# Patient Record
Sex: FEMALE | Race: OTHER | HISPANIC OR LATINO | URBAN - METROPOLITAN AREA
[De-identification: names, ages, dates, MRNs, and addresses within clinical notes are randomized per-mention and may not be internally consistent; named-entity substitution may affect disease eponyms.]

---

## 2017-01-16 ENCOUNTER — EMERGENCY (EMERGENCY)
Facility: HOSPITAL | Age: 47
LOS: 1 days | Discharge: HOME | End: 2017-01-16
Attending: EMERGENCY MEDICINE | Admitting: EMERGENCY MEDICINE
Payer: COMMERCIAL

## 2017-01-16 PROCEDURE — 99285 EMERGENCY DEPT VISIT HI MDM: CPT | Mod: 25

## 2017-01-17 VITALS
DIASTOLIC BLOOD PRESSURE: 76 MMHG | RESPIRATION RATE: 18 BRPM | SYSTOLIC BLOOD PRESSURE: 153 MMHG | HEART RATE: 93 BPM | OXYGEN SATURATION: 100 %

## 2017-01-17 VITALS
OXYGEN SATURATION: 100 % | DIASTOLIC BLOOD PRESSURE: 92 MMHG | RESPIRATION RATE: 16 BRPM | TEMPERATURE: 98 F | SYSTOLIC BLOOD PRESSURE: 152 MMHG | HEART RATE: 104 BPM

## 2017-01-17 DIAGNOSIS — Z90.49 ACQUIRED ABSENCE OF OTHER SPECIFIED PARTS OF DIGESTIVE TRACT: Chronic | ICD-10-CM

## 2017-01-17 LAB
ALBUMIN SERPL ELPH-MCNC: 4.3 G/DL — SIGNIFICANT CHANGE UP (ref 3.3–5)
ALP SERPL-CCNC: 64 U/L — SIGNIFICANT CHANGE UP (ref 40–120)
ALT FLD-CCNC: 9 U/L — SIGNIFICANT CHANGE UP (ref 4–33)
APPEARANCE UR: SIGNIFICANT CHANGE UP
AST SERPL-CCNC: 22 U/L — SIGNIFICANT CHANGE UP (ref 4–32)
BACTERIA # UR AUTO: SIGNIFICANT CHANGE UP
BASE EXCESS BLDV CALC-SCNC: -1.7 MMOL/L — SIGNIFICANT CHANGE UP
BASE EXCESS BLDV CALC-SCNC: -3.5 MMOL/L — SIGNIFICANT CHANGE UP
BASOPHILS # BLD AUTO: 0.01 K/UL — SIGNIFICANT CHANGE UP (ref 0–0.2)
BASOPHILS NFR BLD AUTO: 0.1 % — SIGNIFICANT CHANGE UP (ref 0–2)
BILIRUB SERPL-MCNC: 0.7 MG/DL — SIGNIFICANT CHANGE UP (ref 0.2–1.2)
BILIRUB UR-MCNC: NEGATIVE — SIGNIFICANT CHANGE UP
BLOOD GAS VENOUS - CREATININE: 0.61 MG/DL — SIGNIFICANT CHANGE UP (ref 0.5–1.3)
BLOOD GAS VENOUS - CREATININE: 0.7 MG/DL — SIGNIFICANT CHANGE UP (ref 0.5–1.3)
BLOOD UR QL VISUAL: HIGH
BUN SERPL-MCNC: 10 MG/DL — SIGNIFICANT CHANGE UP (ref 7–23)
CALCIUM SERPL-MCNC: 9.3 MG/DL — SIGNIFICANT CHANGE UP (ref 8.4–10.5)
CHLORIDE BLDV-SCNC: 110 MMOL/L — HIGH (ref 96–108)
CHLORIDE BLDV-SCNC: 115 MMOL/L — HIGH (ref 96–108)
CHLORIDE SERPL-SCNC: 104 MMOL/L — SIGNIFICANT CHANGE UP (ref 98–107)
CO2 SERPL-SCNC: 20 MMOL/L — LOW (ref 22–31)
COLOR SPEC: YELLOW — SIGNIFICANT CHANGE UP
CREAT SERPL-MCNC: 0.78 MG/DL — SIGNIFICANT CHANGE UP (ref 0.5–1.3)
EOSINOPHIL # BLD AUTO: 0.03 K/UL — SIGNIFICANT CHANGE UP (ref 0–0.5)
EOSINOPHIL NFR BLD AUTO: 0.2 % — SIGNIFICANT CHANGE UP (ref 0–6)
GAS PNL BLDV: 138 MMOL/L — SIGNIFICANT CHANGE UP (ref 136–146)
GAS PNL BLDV: 139 MMOL/L — SIGNIFICANT CHANGE UP (ref 136–146)
GLUCOSE BLDV-MCNC: 126 — HIGH (ref 70–99)
GLUCOSE BLDV-MCNC: 158 — HIGH (ref 70–99)
GLUCOSE SERPL-MCNC: 163 MG/DL — HIGH (ref 70–99)
GLUCOSE UR-MCNC: NEGATIVE — SIGNIFICANT CHANGE UP
HCO3 BLDV-SCNC: 21 MMOL/L — SIGNIFICANT CHANGE UP (ref 20–27)
HCO3 BLDV-SCNC: 21 MMOL/L — SIGNIFICANT CHANGE UP (ref 20–27)
HCT VFR BLD CALC: 42.1 % — SIGNIFICANT CHANGE UP (ref 34.5–45)
HCT VFR BLDV CALC: 38.6 % — SIGNIFICANT CHANGE UP (ref 34.5–45)
HCT VFR BLDV CALC: 45.1 % — HIGH (ref 34.5–45)
HGB BLD-MCNC: 14.1 G/DL — SIGNIFICANT CHANGE UP (ref 11.5–15.5)
HGB BLDV-MCNC: 12.6 G/DL — SIGNIFICANT CHANGE UP (ref 11.5–15.5)
HGB BLDV-MCNC: 14.7 G/DL — SIGNIFICANT CHANGE UP (ref 11.5–15.5)
IMM GRANULOCYTES NFR BLD AUTO: 0.2 % — SIGNIFICANT CHANGE UP (ref 0–1.5)
KETONES UR-MCNC: SIGNIFICANT CHANGE UP
LACTATE BLDV-MCNC: 1.8 MMOL/L — SIGNIFICANT CHANGE UP (ref 0.5–2)
LACTATE BLDV-MCNC: 2.9 MMOL/L — HIGH (ref 0.5–2)
LEUKOCYTE ESTERASE UR-ACNC: HIGH
LIDOCAIN IGE QN: 24.1 U/L — SIGNIFICANT CHANGE UP (ref 7–60)
LYMPHOCYTES # BLD AUTO: 0.61 K/UL — LOW (ref 1–3.3)
LYMPHOCYTES # BLD AUTO: 4.5 % — LOW (ref 13–44)
MCHC RBC-ENTMCNC: 28.8 PG — SIGNIFICANT CHANGE UP (ref 27–34)
MCHC RBC-ENTMCNC: 33.5 % — SIGNIFICANT CHANGE UP (ref 32–36)
MCV RBC AUTO: 85.9 FL — SIGNIFICANT CHANGE UP (ref 80–100)
MONOCYTES # BLD AUTO: 0.25 K/UL — SIGNIFICANT CHANGE UP (ref 0–0.9)
MONOCYTES NFR BLD AUTO: 1.9 % — LOW (ref 2–14)
MUCOUS THREADS # UR AUTO: SIGNIFICANT CHANGE UP
NEUTROPHILS # BLD AUTO: 12.52 K/UL — HIGH (ref 1.8–7.4)
NEUTROPHILS NFR BLD AUTO: 93.1 % — HIGH (ref 43–77)
NITRITE UR-MCNC: NEGATIVE — SIGNIFICANT CHANGE UP
PCO2 BLDV: 38 MMHG — LOW (ref 41–51)
PCO2 BLDV: 40 MMHG — LOW (ref 41–51)
PH BLDV: 7.36 PH — SIGNIFICANT CHANGE UP (ref 7.32–7.43)
PH BLDV: 7.38 PH — SIGNIFICANT CHANGE UP (ref 7.32–7.43)
PH UR: 6 — SIGNIFICANT CHANGE UP (ref 4.6–8)
PLATELET # BLD AUTO: 275 K/UL — SIGNIFICANT CHANGE UP (ref 150–400)
PMV BLD: 11.8 FL — SIGNIFICANT CHANGE UP (ref 7–13)
PO2 BLDV: 37 MMHG — SIGNIFICANT CHANGE UP (ref 35–40)
PO2 BLDV: < 24 MMHG — LOW (ref 35–40)
POTASSIUM BLDV-SCNC: 3.5 MMOL/L — SIGNIFICANT CHANGE UP (ref 3.4–4.5)
POTASSIUM BLDV-SCNC: 3.6 MMOL/L — SIGNIFICANT CHANGE UP (ref 3.4–4.5)
POTASSIUM SERPL-MCNC: 3.9 MMOL/L — SIGNIFICANT CHANGE UP (ref 3.5–5.3)
POTASSIUM SERPL-SCNC: 3.9 MMOL/L — SIGNIFICANT CHANGE UP (ref 3.5–5.3)
PROT SERPL-MCNC: 8.2 G/DL — SIGNIFICANT CHANGE UP (ref 6–8.3)
PROT UR-MCNC: 100 — HIGH
RBC # BLD: 4.9 M/UL — SIGNIFICANT CHANGE UP (ref 3.8–5.2)
RBC # FLD: 13.6 % — SIGNIFICANT CHANGE UP (ref 10.3–14.5)
RBC CASTS # UR COMP ASSIST: SIGNIFICANT CHANGE UP (ref 0–?)
SAO2 % BLDV: 23.4 % — LOW (ref 60–85)
SAO2 % BLDV: 64.3 % — SIGNIFICANT CHANGE UP (ref 60–85)
SODIUM SERPL-SCNC: 141 MMOL/L — SIGNIFICANT CHANGE UP (ref 135–145)
SP GR SPEC: 1.04 — HIGH (ref 1–1.03)
SQUAMOUS # UR AUTO: SIGNIFICANT CHANGE UP
UROBILINOGEN FLD QL: NORMAL E.U. — SIGNIFICANT CHANGE UP (ref 0.1–0.2)
WBC # BLD: 13.45 K/UL — HIGH (ref 3.8–10.5)
WBC # FLD AUTO: 13.45 K/UL — HIGH (ref 3.8–10.5)
WBC UR QL: SIGNIFICANT CHANGE UP (ref 0–?)

## 2017-01-17 RX ORDER — ONDANSETRON 8 MG/1
1 TABLET, FILM COATED ORAL
Qty: 6 | Refills: 0 | OUTPATIENT
Start: 2017-01-17 | End: 2017-01-19

## 2017-01-17 RX ORDER — FAMOTIDINE 10 MG/ML
20 INJECTION INTRAVENOUS ONCE
Qty: 0 | Refills: 0 | Status: COMPLETED | OUTPATIENT
Start: 2017-01-17 | End: 2017-01-17

## 2017-01-17 RX ORDER — KETOROLAC TROMETHAMINE 30 MG/ML
15 SYRINGE (ML) INJECTION ONCE
Qty: 0 | Refills: 0 | Status: DISCONTINUED | OUTPATIENT
Start: 2017-01-17 | End: 2017-01-17

## 2017-01-17 RX ORDER — SODIUM CHLORIDE 9 MG/ML
1000 INJECTION INTRAMUSCULAR; INTRAVENOUS; SUBCUTANEOUS ONCE
Qty: 0 | Refills: 0 | Status: COMPLETED | OUTPATIENT
Start: 2017-01-17 | End: 2017-01-17

## 2017-01-17 RX ORDER — ONDANSETRON 8 MG/1
4 TABLET, FILM COATED ORAL ONCE
Qty: 0 | Refills: 0 | Status: COMPLETED | OUTPATIENT
Start: 2017-01-17 | End: 2017-01-17

## 2017-01-17 RX ADMIN — FAMOTIDINE 20 MILLIGRAM(S): 10 INJECTION INTRAVENOUS at 03:01

## 2017-01-17 RX ADMIN — Medication 15 MILLIGRAM(S): at 03:01

## 2017-01-17 RX ADMIN — SODIUM CHLORIDE 1000 MILLILITER(S): 9 INJECTION INTRAMUSCULAR; INTRAVENOUS; SUBCUTANEOUS at 04:26

## 2017-01-17 RX ADMIN — ONDANSETRON 4 MILLIGRAM(S): 8 TABLET, FILM COATED ORAL at 03:02

## 2017-01-17 RX ADMIN — SODIUM CHLORIDE 1000 MILLILITER(S): 9 INJECTION INTRAMUSCULAR; INTRAVENOUS; SUBCUTANEOUS at 02:57

## 2017-01-17 RX ADMIN — Medication 15 MILLIGRAM(S): at 04:39

## 2017-01-17 NOTE — ED ADULT NURSE NOTE - OBJECTIVE STATEMENT
pt presents to R#13 AOx4, in NAD, c/o midepigastric abdominal pain "cramping" waxing and waning x5 hrs with multiple episodes vomiting and diarrhea, unable to tolerate any PO intake. Denies any new/ unusual food intake today, deneis recent travel, denies fevers/ chills/ urinary changes/ other acute medical complaints. Well-appearing, is not actively vomiting at this time. IV inserted, blood work collected and sent, MD at bedside. Awaiting test results. Will cont to monitor.

## 2017-01-17 NOTE — ED PROVIDER NOTE - ATTENDING CONTRIBUTION TO CARE
Attending Attestation: Dr. Martin  I have personally performed a history and physical examination of the patient and discussed management with the resident as well as the patient.  I reviewed the resident's note and agree with the documented findings and plan of care.  I have authored and modified critical sections of the Provider Note, including but not limited to HPI, Physical Exam and MDM. 46F p/w vague abdominal pain. generalized a/w N/V. suggestive of possible AGE. Soft, diffusely tender abd. Would consider biliary etiology although less likely given prior gallbladder removal. Would hydrate, CBC,UCG,CMP, zofran, toradal.pepcid. reassess.

## 2017-01-17 NOTE — ED PROVIDER NOTE - ABDOMINAL TENDER
umbilical/no CVAT. no guarding. no rebound. normal BS./right lower quadrant/periumbilical/right upper quadrant/left lower quadrant/left upper quadrant

## 2017-01-17 NOTE — ED PROVIDER NOTE - MEDICAL DECISION MAKING DETAILS
46F p/w vague abdominal pain. generalized. suggestive of possible viral source. would evaluate for biliary etiology alothought less likely given prior gallbladder removal. clinically looks unremarkable. would hydrate, CBC,UCG,CMP, zofran, toradal.pepcid. reassess. 46F p/w vague abdominal pain. generalized a/w N/V. suggestive of possible AGE. Soft, diffusely tender abd. Would consider biliary etiology although less likely given prior gallbladder removal. Would hydrate, CBC,UCG,CMP, zofran, toradal.pepcid. reassess.

## 2017-01-17 NOTE — ED PROVIDER NOTE - CONSTITUTIONAL, MLM
normal... Well appearing, well nourished, awake, alert, oriented to person, place, time/situation and in no apparent distress. - - -

## 2017-01-17 NOTE — ED PROVIDER NOTE - OBJECTIVE STATEMENT
46F no sig. pmhx p/w diffuse abd pain starting 9pm. cramping pain. associated with n/V/D. non-bloody. no fever (+) chills. no radiation. not-associated with food intake. no  symptoms. no Vaginal symptoms. LMP week of christmas. no recent travel or sick contact. no environmental exposure. no syncope. no CP/SOB. hx of cholecystectomy. 46F no sig. pmhx p/w diffuse abd pain starting 9pm. cramping pain. associated with n/V/D. non-bloody. no fever (+) chills. no radiation. not-associated with food intake. no  symptoms. no Vaginal symptoms. LMP week of shaniqua. no recent travel or sick contact. no environmental exposure. no syncope. no CP/SOB. hx of cholecystectomy. Is passing flatus. This has nnever happened before.

## 2017-01-17 NOTE — ED PROVIDER NOTE - PROGRESS NOTE DETAILS
Pain much better, still feeling dehydrated. Pain improved. feels better. D/C home to continue hydration. Tylenol and/or motrin for pain.

## 2017-04-29 ENCOUNTER — APPOINTMENT (OUTPATIENT)
Dept: INTERNAL MEDICINE | Facility: CLINIC | Age: 47
End: 2017-04-29

## 2017-04-29 VITALS
HEIGHT: 59 IN | RESPIRATION RATE: 12 BRPM | BODY MASS INDEX: 34.27 KG/M2 | HEART RATE: 85 BPM | OXYGEN SATURATION: 98 % | SYSTOLIC BLOOD PRESSURE: 121 MMHG | DIASTOLIC BLOOD PRESSURE: 80 MMHG | WEIGHT: 170 LBS | TEMPERATURE: 98.1 F

## 2017-04-29 DIAGNOSIS — M25.561 PAIN IN RIGHT KNEE: ICD-10-CM

## 2017-04-29 DIAGNOSIS — Z80.1 FAMILY HISTORY OF MALIGNANT NEOPLASM OF TRACHEA, BRONCHUS AND LUNG: ICD-10-CM

## 2017-04-29 RX ORDER — ETONOGESTREL AND ETHINYL ESTRADIOL .12; .015 MG/D; MG/D
0.12-0.015 INSERT, EXTENDED RELEASE VAGINAL
Qty: 3 | Refills: 0 | Status: ACTIVE | COMMUNITY
Start: 2017-03-29

## 2017-04-29 RX ORDER — BRIMONIDINE TARTRATE, TIMOLOL MALEATE 2; 5 MG/ML; MG/ML
0.2-0.5 SOLUTION/ DROPS OPHTHALMIC
Refills: 0 | Status: ACTIVE | COMMUNITY

## 2017-04-29 RX ORDER — ONDANSETRON 4 MG/1
4 TABLET, ORALLY DISINTEGRATING ORAL
Qty: 6 | Refills: 0 | Status: DISCONTINUED | COMMUNITY
Start: 2017-01-17

## 2017-05-08 LAB
25(OH)D3 SERPL-MCNC: 35.9 NG/ML
ALBUMIN SERPL ELPH-MCNC: 4.2 G/DL
ALP BLD-CCNC: 69 U/L
ALT SERPL-CCNC: 27 U/L
ANION GAP SERPL CALC-SCNC: 17 MMOL/L
AST SERPL-CCNC: 23 U/L
BASOPHILS # BLD AUTO: 0.02 K/UL
BASOPHILS NFR BLD AUTO: 0.3 %
BILIRUB SERPL-MCNC: 0.4 MG/DL
BUN SERPL-MCNC: 10 MG/DL
CALCIUM SERPL-MCNC: 9.6 MG/DL
CHLORIDE SERPL-SCNC: 106 MMOL/L
CHOLEST SERPL-MCNC: 176 MG/DL
CHOLEST/HDLC SERPL: 2.6 RATIO
CO2 SERPL-SCNC: 20 MMOL/L
CREAT SERPL-MCNC: 0.69 MG/DL
EOSINOPHIL # BLD AUTO: 0.17 K/UL
EOSINOPHIL NFR BLD AUTO: 2.5 %
GLUCOSE SERPL-MCNC: 92 MG/DL
HBA1C MFR BLD HPLC: 5.7 %
HCT VFR BLD CALC: 41.8 %
HDLC SERPL-MCNC: 68 MG/DL
HGB BLD-MCNC: 13.2 G/DL
IMM GRANULOCYTES NFR BLD AUTO: 0 %
LDLC SERPL CALC-MCNC: 64 MG/DL
LYMPHOCYTES # BLD AUTO: 2.69 K/UL
LYMPHOCYTES NFR BLD AUTO: 40 %
MAN DIFF?: NORMAL
MCHC RBC-ENTMCNC: 28.4 PG
MCHC RBC-ENTMCNC: 31.6 GM/DL
MCV RBC AUTO: 89.9 FL
MONOCYTES # BLD AUTO: 0.36 K/UL
MONOCYTES NFR BLD AUTO: 5.4 %
NEUTROPHILS # BLD AUTO: 3.48 K/UL
NEUTROPHILS NFR BLD AUTO: 51.8 %
PLATELET # BLD AUTO: 293 K/UL
POTASSIUM SERPL-SCNC: 4.5 MMOL/L
PROT SERPL-MCNC: 7.2 G/DL
RBC # BLD: 4.65 M/UL
RBC # FLD: 14.6 %
SODIUM SERPL-SCNC: 143 MMOL/L
TRIGL SERPL-MCNC: 221 MG/DL
TSH SERPL-ACNC: 0.79 UIU/ML
VIT B12 SERPL-MCNC: 461 PG/ML
WBC # FLD AUTO: 6.72 K/UL

## 2017-05-23 LAB
APPEARANCE: ABNORMAL
BACTERIA: ABNORMAL
BILIRUBIN URINE: NEGATIVE
BLOOD URINE: NEGATIVE
COLOR: ABNORMAL
GLUCOSE QUALITATIVE U: NORMAL MG/DL
HYALINE CASTS: 0 /LPF
KETONES URINE: NEGATIVE
LEUKOCYTE ESTERASE URINE: NEGATIVE
MICROSCOPIC-UA: NORMAL
NITRITE URINE: NEGATIVE
PH URINE: 5.5
PROTEIN URINE: NEGATIVE MG/DL
RED BLOOD CELLS URINE: 1 /HPF
SPECIFIC GRAVITY URINE: 1.03
SQUAMOUS EPITHELIAL CELLS: >27 /HPF
UROBILINOGEN URINE: NORMAL MG/DL
WHITE BLOOD CELLS URINE: 5 /HPF

## 2018-02-28 ENCOUNTER — APPOINTMENT (OUTPATIENT)
Dept: INTERNAL MEDICINE | Facility: CLINIC | Age: 48
End: 2018-02-28
Payer: COMMERCIAL

## 2018-02-28 VITALS
DIASTOLIC BLOOD PRESSURE: 88 MMHG | WEIGHT: 167 LBS | SYSTOLIC BLOOD PRESSURE: 153 MMHG | HEART RATE: 86 BPM | OXYGEN SATURATION: 98 % | HEIGHT: 59 IN | RESPIRATION RATE: 12 BRPM | BODY MASS INDEX: 33.67 KG/M2 | TEMPERATURE: 99 F

## 2018-02-28 VITALS — DIASTOLIC BLOOD PRESSURE: 92 MMHG | SYSTOLIC BLOOD PRESSURE: 160 MMHG

## 2018-02-28 PROCEDURE — 90715 TDAP VACCINE 7 YRS/> IM: CPT

## 2018-02-28 PROCEDURE — 90471 IMMUNIZATION ADMIN: CPT

## 2018-02-28 PROCEDURE — 99214 OFFICE O/P EST MOD 30 MIN: CPT | Mod: 25

## 2018-03-07 ENCOUNTER — APPOINTMENT (OUTPATIENT)
Dept: INTERNAL MEDICINE | Facility: CLINIC | Age: 48
End: 2018-03-07

## 2018-03-09 ENCOUNTER — APPOINTMENT (OUTPATIENT)
Dept: INTERNAL MEDICINE | Facility: CLINIC | Age: 48
End: 2018-03-09
Payer: COMMERCIAL

## 2018-03-09 ENCOUNTER — NON-APPOINTMENT (OUTPATIENT)
Age: 48
End: 2018-03-09

## 2018-03-09 VITALS
HEIGHT: 59 IN | RESPIRATION RATE: 12 BRPM | BODY MASS INDEX: 32.86 KG/M2 | DIASTOLIC BLOOD PRESSURE: 94 MMHG | HEART RATE: 90 BPM | SYSTOLIC BLOOD PRESSURE: 154 MMHG | WEIGHT: 163 LBS | OXYGEN SATURATION: 99 % | TEMPERATURE: 99.1 F

## 2018-03-09 DIAGNOSIS — Z23 ENCOUNTER FOR IMMUNIZATION: ICD-10-CM

## 2018-03-09 DIAGNOSIS — H93.19 TINNITUS, UNSPECIFIED EAR: ICD-10-CM

## 2018-03-09 PROCEDURE — 93000 ELECTROCARDIOGRAM COMPLETE: CPT

## 2018-03-09 PROCEDURE — 99214 OFFICE O/P EST MOD 30 MIN: CPT

## 2018-03-11 PROBLEM — H93.19 TINNITUS: Status: ACTIVE | Noted: 2018-03-09

## 2018-03-11 PROBLEM — Z23 NEED FOR TDAP VACCINATION: Status: ACTIVE | Noted: 2018-02-28

## 2018-03-11 RX ORDER — MELOXICAM 7.5 MG/1
7.5 TABLET ORAL DAILY
Qty: 30 | Refills: 0 | Status: DISCONTINUED | COMMUNITY
Start: 2017-04-29 | End: 2018-03-11

## 2018-03-20 LAB
ALBUMIN SERPL ELPH-MCNC: 3.9 G/DL
ALP BLD-CCNC: 63 U/L
ALT SERPL-CCNC: 15 U/L
ANION GAP SERPL CALC-SCNC: 14 MMOL/L
AST SERPL-CCNC: 21 U/L
BILIRUB SERPL-MCNC: 0.2 MG/DL
BUN SERPL-MCNC: 12 MG/DL
CALCIUM SERPL-MCNC: 9.3 MG/DL
CHLORIDE SERPL-SCNC: 103 MMOL/L
CO2 SERPL-SCNC: 24 MMOL/L
CREAT SERPL-MCNC: 0.43 MG/DL
GLUCOSE SERPL-MCNC: 71 MG/DL
POTASSIUM SERPL-SCNC: 4.1 MMOL/L
PROT SERPL-MCNC: 7.5 G/DL
SODIUM SERPL-SCNC: 141 MMOL/L
TSH SERPL-ACNC: 0.69 UIU/ML

## 2018-04-07 ENCOUNTER — APPOINTMENT (OUTPATIENT)
Dept: INTERNAL MEDICINE | Facility: CLINIC | Age: 48
End: 2018-04-07
Payer: COMMERCIAL

## 2018-04-07 VITALS
WEIGHT: 168 LBS | TEMPERATURE: 98.5 F | DIASTOLIC BLOOD PRESSURE: 90 MMHG | SYSTOLIC BLOOD PRESSURE: 139 MMHG | HEIGHT: 59 IN | BODY MASS INDEX: 33.87 KG/M2 | RESPIRATION RATE: 12 BRPM | HEART RATE: 84 BPM | OXYGEN SATURATION: 98 %

## 2018-04-07 PROCEDURE — 99214 OFFICE O/P EST MOD 30 MIN: CPT

## 2018-05-18 ENCOUNTER — APPOINTMENT (OUTPATIENT)
Dept: INTERNAL MEDICINE | Facility: CLINIC | Age: 48
End: 2018-05-18
Payer: COMMERCIAL

## 2018-05-18 VITALS
DIASTOLIC BLOOD PRESSURE: 89 MMHG | HEART RATE: 79 BPM | BODY MASS INDEX: 34.27 KG/M2 | OXYGEN SATURATION: 99 % | SYSTOLIC BLOOD PRESSURE: 142 MMHG | RESPIRATION RATE: 12 BRPM | WEIGHT: 170 LBS | TEMPERATURE: 98.6 F | HEIGHT: 59 IN

## 2018-05-18 PROCEDURE — 99214 OFFICE O/P EST MOD 30 MIN: CPT

## 2018-05-18 RX ORDER — LOSARTAN POTASSIUM 25 MG/1
25 TABLET, FILM COATED ORAL
Qty: 30 | Refills: 0 | Status: DISCONTINUED | COMMUNITY
Start: 2018-03-09

## 2018-05-18 NOTE — COUNSELING
[Weight management counseling provided] : Weight management [Healthy eating counseling provided] : healthy eating

## 2018-05-18 NOTE — PLAN
[FreeTextEntry1] : 1. Hypertension \par low Na diet\par Losartan 50 mg QD\par 2. Hypercholesterolemia/ Obesity\par low cholesterol diet/ exercise\par repeat fasting lipids next visit

## 2018-05-18 NOTE — PHYSICAL EXAM
[No Acute Distress] : no acute distress [Well Nourished] : well nourished [Well Developed] : well developed [Normal Sclera/Conjunctiva] : normal sclera/conjunctiva [EOMI] : extraocular movements intact [Normal Outer Ear/Nose] : the outer ears and nose were normal in appearance [Normal Oropharynx] : the oropharynx was normal [No JVD] : no jugular venous distention [Supple] : supple [No Lymphadenopathy] : no lymphadenopathy [Thyroid Normal, No Nodules] : the thyroid was normal and there were no nodules present [No Respiratory Distress] : no respiratory distress  [Clear to Auscultation] : lungs were clear to auscultation bilaterally [No Accessory Muscle Use] : no accessory muscle use [Normal Rate] : normal rate  [Regular Rhythm] : with a regular rhythm [Normal S1, S2] : normal S1 and S2 [No Murmur] : no murmur heard [No Carotid Bruits] : no carotid bruits [Pedal Pulses Present] : the pedal pulses are present [No Edema] : there was no peripheral edema [Soft] : abdomen soft [Non Tender] : non-tender [Non-distended] : non-distended [No Masses] : no abdominal mass palpated [No HSM] : no HSM [Normal Bowel Sounds] : normal bowel sounds [Normal Posterior Cervical Nodes] : no posterior cervical lymphadenopathy [Normal Anterior Cervical Nodes] : no anterior cervical lymphadenopathy [No CVA Tenderness] : no CVA  tenderness [No Spinal Tenderness] : no spinal tenderness [No Joint Swelling] : no joint swelling [Grossly Normal Strength/Tone] : grossly normal strength/tone [No Rash] : no rash [Normal Gait] : normal gait [Coordination Grossly Intact] : coordination grossly intact [No Focal Deficits] : no focal deficits [Deep Tendon Reflexes (DTR)] : deep tendon reflexes were 2+ and symmetric [Normal Affect] : the affect was normal [Normal Insight/Judgement] : insight and judgment were intact [de-identified] : obese

## 2018-05-18 NOTE — HISTORY OF PRESENT ILLNESS
[de-identified] : 47 year old female with h/o Glaucoma/ Hypertension/ Hypercholesterolemia  presents for follow up on hypertension. \par She is doing well, physically active, unable to loose weight. She denies CP/SOB, dizziness, N, V, abdominal pain.

## 2018-05-21 LAB
ANION GAP SERPL CALC-SCNC: 13 MMOL/L
BUN SERPL-MCNC: 10 MG/DL
CALCIUM SERPL-MCNC: 9.4 MG/DL
CHLORIDE SERPL-SCNC: 106 MMOL/L
CO2 SERPL-SCNC: 20 MMOL/L
CREAT SERPL-MCNC: 0.66 MG/DL
GLUCOSE SERPL-MCNC: 123 MG/DL
POTASSIUM SERPL-SCNC: 3.9 MMOL/L
SODIUM SERPL-SCNC: 139 MMOL/L

## 2018-06-01 ENCOUNTER — MEDICATION RENEWAL (OUTPATIENT)
Age: 48
End: 2018-06-01

## 2018-06-04 ENCOUNTER — RX RENEWAL (OUTPATIENT)
Age: 48
End: 2018-06-04

## 2018-08-04 ENCOUNTER — APPOINTMENT (OUTPATIENT)
Dept: INTERNAL MEDICINE | Facility: CLINIC | Age: 48
End: 2018-08-04
Payer: COMMERCIAL

## 2018-08-04 VITALS
HEIGHT: 56 IN | HEART RATE: 86 BPM | BODY MASS INDEX: 38.02 KG/M2 | OXYGEN SATURATION: 97 % | DIASTOLIC BLOOD PRESSURE: 92 MMHG | TEMPERATURE: 98.9 F | WEIGHT: 169 LBS | SYSTOLIC BLOOD PRESSURE: 139 MMHG | RESPIRATION RATE: 12 BRPM

## 2018-08-04 PROCEDURE — 99214 OFFICE O/P EST MOD 30 MIN: CPT

## 2018-08-11 ENCOUNTER — APPOINTMENT (OUTPATIENT)
Dept: INTERNAL MEDICINE | Facility: CLINIC | Age: 48
End: 2018-08-11

## 2018-08-20 NOTE — PLAN
[FreeTextEntry1] : 1. Hypertension \par low Na diet\par Losartan 50 mg QD\par HCTZ 12.5 mg QD\par 2. Hypercholesterolemia/ Obesity\par low cholesterol diet/ exercise\par pt to come for fasting lipids

## 2018-08-20 NOTE — PHYSICAL EXAM
[No Acute Distress] : no acute distress [Well Nourished] : well nourished [Well Developed] : well developed [Normal Sclera/Conjunctiva] : normal sclera/conjunctiva [EOMI] : extraocular movements intact [Normal Outer Ear/Nose] : the outer ears and nose were normal in appearance [Normal Oropharynx] : the oropharynx was normal [Normal TMs] : both tympanic membranes were normal [No JVD] : no jugular venous distention [Supple] : supple [No Lymphadenopathy] : no lymphadenopathy [Thyroid Normal, No Nodules] : the thyroid was normal and there were no nodules present [No Respiratory Distress] : no respiratory distress  [Clear to Auscultation] : lungs were clear to auscultation bilaterally [No Accessory Muscle Use] : no accessory muscle use [Normal Rate] : normal rate  [Regular Rhythm] : with a regular rhythm [Normal S1, S2] : normal S1 and S2 [No Murmur] : no murmur heard [No Carotid Bruits] : no carotid bruits [Pedal Pulses Present] : the pedal pulses are present [No Edema] : there was no peripheral edema [Soft] : abdomen soft [Non Tender] : non-tender [Non-distended] : non-distended [No Masses] : no abdominal mass palpated [No HSM] : no HSM [Normal Bowel Sounds] : normal bowel sounds [Normal Posterior Cervical Nodes] : no posterior cervical lymphadenopathy [Normal Anterior Cervical Nodes] : no anterior cervical lymphadenopathy [No CVA Tenderness] : no CVA  tenderness [No Spinal Tenderness] : no spinal tenderness [No Joint Swelling] : no joint swelling [Grossly Normal Strength/Tone] : grossly normal strength/tone [No Rash] : no rash [Normal Gait] : normal gait [Coordination Grossly Intact] : coordination grossly intact [No Focal Deficits] : no focal deficits [Deep Tendon Reflexes (DTR)] : deep tendon reflexes were 2+ and symmetric [Normal Affect] : the affect was normal [Normal Insight/Judgement] : insight and judgment were intact [de-identified] : obese

## 2018-08-20 NOTE — HISTORY OF PRESENT ILLNESS
[de-identified] : 47 year old female with h/o  Glaucoma/ Hypertension/ Hypercholesterolemia  presents for follow up on hypertension/ hyperlipidemia . \par She is doing well, physically active. She denies CP/SOB, dizziness, N, V, abdominal pain.

## 2018-10-08 ENCOUNTER — RX RENEWAL (OUTPATIENT)
Age: 48
End: 2018-10-08

## 2018-10-27 ENCOUNTER — APPOINTMENT (OUTPATIENT)
Dept: INTERNAL MEDICINE | Facility: CLINIC | Age: 48
End: 2018-10-27
Payer: COMMERCIAL

## 2018-10-27 VITALS
OXYGEN SATURATION: 97 % | WEIGHT: 172 LBS | SYSTOLIC BLOOD PRESSURE: 129 MMHG | HEIGHT: 56 IN | BODY MASS INDEX: 38.69 KG/M2 | RESPIRATION RATE: 12 BRPM | TEMPERATURE: 98.6 F | DIASTOLIC BLOOD PRESSURE: 83 MMHG | HEART RATE: 77 BPM

## 2018-10-27 PROCEDURE — 99214 OFFICE O/P EST MOD 30 MIN: CPT

## 2018-10-29 LAB
ALBUMIN SERPL ELPH-MCNC: 4 G/DL
ALP BLD-CCNC: 70 U/L
ALT SERPL-CCNC: 20 U/L
ANION GAP SERPL CALC-SCNC: 13 MMOL/L
AST SERPL-CCNC: 19 U/L
BILIRUB SERPL-MCNC: 0.5 MG/DL
BUN SERPL-MCNC: 14 MG/DL
CALCIUM SERPL-MCNC: 9.3 MG/DL
CHLORIDE SERPL-SCNC: 102 MMOL/L
CHOLEST SERPL-MCNC: 170 MG/DL
CHOLEST/HDLC SERPL: 3.3 RATIO
CO2 SERPL-SCNC: 25 MMOL/L
CREAT SERPL-MCNC: 0.6 MG/DL
GLUCOSE SERPL-MCNC: 78 MG/DL
HBA1C MFR BLD HPLC: 5.6 %
HDLC SERPL-MCNC: 51 MG/DL
LDLC SERPL CALC-MCNC: 75 MG/DL
POTASSIUM SERPL-SCNC: 3.8 MMOL/L
PROT SERPL-MCNC: 6.9 G/DL
SODIUM SERPL-SCNC: 140 MMOL/L
TRIGL SERPL-MCNC: 220 MG/DL

## 2018-11-06 NOTE — HISTORY OF PRESENT ILLNESS
[de-identified] : 48 year old female with h/o  Glaucoma/ Hypertension/ Hypercholesterolemia  presents for follow up on hypertension/ hyperlipidemia . \par She is doing well, physically active. She denies CP/SOB, dizziness, N, V, abdominal pain. \par Pt c/o itchy rash b/ l upper thigh area related to excessive sweating.

## 2018-11-06 NOTE — PLAN
[FreeTextEntry1] : 1. Hypertension \par low Na diet\par Losartan 50 mg QD\par HCTZ 12.5 mg QD\par 2. Hypercholesterolemia/ Obesity\par low cholesterol diet/ exercise\par pt to come for fasting lipids \par 3. see plan

## 2018-11-06 NOTE — PHYSICAL EXAM
[No Acute Distress] : no acute distress [Well Nourished] : well nourished [Well Developed] : well developed [Normal Sclera/Conjunctiva] : normal sclera/conjunctiva [EOMI] : extraocular movements intact [Normal Outer Ear/Nose] : the outer ears and nose were normal in appearance [Normal Oropharynx] : the oropharynx was normal [Normal TMs] : both tympanic membranes were normal [No JVD] : no jugular venous distention [Supple] : supple [No Lymphadenopathy] : no lymphadenopathy [Thyroid Normal, No Nodules] : the thyroid was normal and there were no nodules present [No Respiratory Distress] : no respiratory distress  [Clear to Auscultation] : lungs were clear to auscultation bilaterally [No Accessory Muscle Use] : no accessory muscle use [Normal Rate] : normal rate  [Regular Rhythm] : with a regular rhythm [Normal S1, S2] : normal S1 and S2 [No Murmur] : no murmur heard [No Carotid Bruits] : no carotid bruits [Pedal Pulses Present] : the pedal pulses are present [No Edema] : there was no peripheral edema [Soft] : abdomen soft [Non Tender] : non-tender [Non-distended] : non-distended [No Masses] : no abdominal mass palpated [No HSM] : no HSM [Normal Bowel Sounds] : normal bowel sounds [Normal Posterior Cervical Nodes] : no posterior cervical lymphadenopathy [Normal Anterior Cervical Nodes] : no anterior cervical lymphadenopathy [No CVA Tenderness] : no CVA  tenderness [No Spinal Tenderness] : no spinal tenderness [No Joint Swelling] : no joint swelling [Grossly Normal Strength/Tone] : grossly normal strength/tone [Normal Gait] : normal gait [Coordination Grossly Intact] : coordination grossly intact [No Focal Deficits] : no focal deficits [Deep Tendon Reflexes (DTR)] : deep tendon reflexes were 2+ and symmetric [Normal Affect] : the affect was normal [Normal Insight/Judgement] : insight and judgment were intact [de-identified] : obese [de-identified] : + erythematous rash b/l thigh area

## 2018-11-08 LAB
BASOPHILS # BLD AUTO: 0.02 K/UL
BASOPHILS NFR BLD AUTO: 0.3 %
EOSINOPHIL # BLD AUTO: 0.29 K/UL
EOSINOPHIL NFR BLD AUTO: 4.2 %
HCT VFR BLD CALC: 37.3 %
HGB BLD-MCNC: 12.2 G/DL
IMM GRANULOCYTES NFR BLD AUTO: 0.1 %
LYMPHOCYTES # BLD AUTO: 2.58 K/UL
LYMPHOCYTES NFR BLD AUTO: 37 %
MAN DIFF?: NORMAL
MCHC RBC-ENTMCNC: 29.4 PG
MCHC RBC-ENTMCNC: 32.7 GM/DL
MCV RBC AUTO: 89.9 FL
MONOCYTES # BLD AUTO: 0.45 K/UL
MONOCYTES NFR BLD AUTO: 6.4 %
NEUTROPHILS # BLD AUTO: 3.63 K/UL
NEUTROPHILS NFR BLD AUTO: 52 %
PLATELET # BLD AUTO: 306 K/UL
RBC # BLD: 4.15 M/UL
RBC # FLD: 13.8 %
WBC # FLD AUTO: 6.98 K/UL

## 2019-03-08 ENCOUNTER — EMERGENCY (EMERGENCY)
Facility: HOSPITAL | Age: 49
LOS: 1 days | Discharge: HOME | End: 2019-03-08
Attending: EMERGENCY MEDICINE | Admitting: EMERGENCY MEDICINE
Payer: COMMERCIAL

## 2019-03-08 VITALS
OXYGEN SATURATION: 100 % | RESPIRATION RATE: 18 BRPM | TEMPERATURE: 97 F | SYSTOLIC BLOOD PRESSURE: 138 MMHG | DIASTOLIC BLOOD PRESSURE: 86 MMHG | HEART RATE: 90 BPM

## 2019-03-08 DIAGNOSIS — Z90.49 ACQUIRED ABSENCE OF OTHER SPECIFIED PARTS OF DIGESTIVE TRACT: Chronic | ICD-10-CM

## 2019-03-08 LAB
APPEARANCE UR: CLEAR — SIGNIFICANT CHANGE UP
BACTERIA # UR AUTO: SIGNIFICANT CHANGE UP
BILIRUB UR-MCNC: NEGATIVE — SIGNIFICANT CHANGE UP
BLOOD UR QL VISUAL: SIGNIFICANT CHANGE UP
COLOR SPEC: YELLOW — SIGNIFICANT CHANGE UP
GLUCOSE UR-MCNC: NEGATIVE — SIGNIFICANT CHANGE UP
KETONES UR-MCNC: NEGATIVE — SIGNIFICANT CHANGE UP
LEUKOCYTE ESTERASE UR-ACNC: SIGNIFICANT CHANGE UP
MUCOUS THREADS # UR AUTO: SIGNIFICANT CHANGE UP
NITRITE UR-MCNC: NEGATIVE — SIGNIFICANT CHANGE UP
PH UR: 6 — SIGNIFICANT CHANGE UP (ref 5–8)
PROT UR-MCNC: 30 — SIGNIFICANT CHANGE UP
RBC CASTS # UR COMP ASSIST: SIGNIFICANT CHANGE UP (ref 0–?)
SP GR SPEC: 1.03 — SIGNIFICANT CHANGE UP (ref 1–1.04)
SQUAMOUS # UR AUTO: SIGNIFICANT CHANGE UP
UROBILINOGEN FLD QL: NORMAL — SIGNIFICANT CHANGE UP
WBC UR QL: HIGH (ref 0–?)

## 2019-03-08 PROCEDURE — 99283 EMERGENCY DEPT VISIT LOW MDM: CPT

## 2019-03-08 RX ORDER — NITROFURANTOIN MACROCRYSTAL 50 MG
100 CAPSULE ORAL ONCE
Qty: 0 | Refills: 0 | Status: COMPLETED | OUTPATIENT
Start: 2019-03-08 | End: 2019-03-08

## 2019-03-08 RX ORDER — ACETAMINOPHEN 500 MG
650 TABLET ORAL ONCE
Qty: 0 | Refills: 0 | Status: COMPLETED | OUTPATIENT
Start: 2019-03-08 | End: 2019-03-08

## 2019-03-08 RX ORDER — NITROFURANTOIN MACROCRYSTAL 50 MG
1 CAPSULE ORAL
Qty: 10 | Refills: 0 | OUTPATIENT
Start: 2019-03-08 | End: 2019-03-12

## 2019-03-08 RX ADMIN — Medication 650 MILLIGRAM(S): at 12:43

## 2019-03-08 RX ADMIN — Medication 100 MILLIGRAM(S): at 15:55

## 2019-03-08 NOTE — ED PROVIDER NOTE - OBJECTIVE STATEMENT
48 yoF, PMHx of HTN, presents to ED with 5 days of lower abdominal pain that worsened 2 days ago. Pain has been constant and aching in the lower abdomen, worse in suprapubic region. No N/V or fever/chills. Feels constipated. Took [place] and had minimal bowel movement after. Denies dysuria or hematuria but does report some hesitancy. Seen at urgent care this am and told "maybe white cells in urine" but referred here for further evaluation. Has good appetite and has been eating without issue, is currently hungry. Denies any vaginal bleeding/discharge. LNMP 2/11/19. Hx of cholecystectomy. Has hx of ovarian cyst that was last evaluated by US 1.5 years ago.

## 2019-03-08 NOTE — ED PROVIDER NOTE - NSFOLLOWUPINSTRUCTIONS_ED_ALL_ED_FT
You have a urinary tract infection. You will be given a prescription for Macrobid. Take it two times a day for 5 days. Your first dose was given to you here.   Drink lots of water.     Follow up with your doctor.     Return to ER for new or concerning symptoms.

## 2019-03-08 NOTE — ED PROVIDER NOTE - ATTENDING CONTRIBUTION TO CARE
48 year old female c/o suprapubic discomfort with intermittent urinary frequency x few days. PE: NAD, + mild suprapubic tenderness. I&P: acute cystitis, abx, PMD Follow up

## 2019-03-08 NOTE — ED PROVIDER NOTE - RESPIRATORY, MLM
"General Discharge Instructions  · Alcohol to umbilical cord with each diaper change, cord goes outside of diaper  · Sponge bathe until cord falls off  · Bottle feed every 3-4 hours  · Place a  on his or her back to sleep, during naps and at night. Do not put an infant on his or her stomach to sleep. Never lay a  down to sleep on a pillow, cushion, quilt, waterbed, or sheepskin. Make sure soft toys and loose bedding are not in your babys sleep area. Dont use blankets, pillows, quilts, and pillow-like crib bumpers. These can raise a s risk of suffocating.  · Signs of Jaundice: If a baby has developed jaundice, the skin or whites of the eyes turn yellow. It usually shows up 3-4 days after birth.   · Use a car seat every time your baby rides in the vehicle.  · Have your visitors always wash their hands before handling the baby.    Report these to the doctor:  · Temperature of 100.4 or greater  · Diarrhea or vomiting  · Sleepy/unarousable  · Not eating or eating less  · Baby "not acting right"  · Yellow skin  · Less than 6 wet diapers per day      Discharge Instructions: Keeping Your [place] Warm   Your baby cant tell you when he or she is too hot or cold. So, you need to keep your home warm enough and make sure the baby is dressed right. Keep the temperature in your home in the low 70s. Dress the baby the way you would want to be dressed for that temperature. During sleep, dress the baby in a sleeper or an infant zip-up blanket. Keeping the babys temperature in a normal range helps keep him or her comfortable and healthy.   How to know if your baby is uncomfortable   You can often tell if a baby is uncomfortable by looking at and touching her skin:   Hands that feel cold or look blue or blotchy mean the baby is too cold. Swaddle the baby in a blanket or put on a hat, sweater, jumper (onesie) with feet, or socks.   Flushed, red skin means the baby is too hot. Restlessness is another sign. " Remove some clothing or a blanket.   How to swaddle your baby   Wrapping your baby securely in a blanket (swaddling) helps the baby feel warm and safe. Here is one method:   Fold a square blanket diagonally to make a triangle. Turn the triangle so the flat base is at the top and the point is at the bottom.   Lay the baby on top of the blanket with the head above the straight base of the triangle (the shoulders should be even with the base of the triangle) and the feet toward the point.   Pull 1 side of the triangle all the way over the babys torso and tuck it under the babys body. You can pull the blanket over the babys arms to keep them contained. Or, you can leave 1 arm free so the baby can suck on its fingers.   Bring the bottom of the blanket loosely over the babys feet and all the way up to the neck. It is very important to keep the baby's feet and legs free to move. Tight swaddling is associated with a condition called hip dysplasia. If your baby has hip dysplasia, do not swaddle him or her. Hip dysplasia is when the hip joint does not form normally.   Wrap the other side of the triangle across the babys chest.   After your baby is swaddled, place your baby on his or her back for sleep, even at naptime. Check often for the following:   The blanket stays secure. A loose blanket can cover the babys face and cause suffocation.   The baby is not overheated. If your baby is hot, remove the blanket and use a lighter blanket or sheet, and swaddle again.    Discharge Instructions: Preventing Shaken Baby Syndrome   Shaking a baby, even slightly, is very dangerous. It causes a serious problem called shaken baby syndrome. This can lead to major brain damage and death. When a baby wont stop crying, it can be frustrating. The stress of caring for a baby, especially if your baby has been sick, puts a strain on the parents. But no matter how fed up, tired, or upset you are, you should NEVER shake your baby.     Why  its a problem   When a baby is shaken, the brain moves back and forth inside the skull. Even a little force could cause the brain to hit the inside of the skull. This can result in bleeding and swelling inside the skull. It can lead to permanent brain damage, coma, or death.   If youre frustrated   If you feel yourself getting fed up, heres how to cope:   Put the baby down in a safe place, even if the baby is crying.   Take a deep breath. Walk away. Count to 10. Do whatever else you need to do to calm down.   Let others help you take care of the baby. Trade off with your partner, the babys grandparents, or other family members.   Talk with your babys doctor about whats causing the crying. There could be a health problem or other issue thats making the baby cry more than normal. The doctor can also give you ideas for how to console your crying baby.   If your babys doctor believes your baby is just fussy, know that this is not your fault. Your baby will grow out of this period of fussiness. It does not mean the baby does not love you, or that you are not doing a good job.   If youre feeling overwhelmed, talk with your babys doctor about  options, counseling, or other resources that can help.   Call the [facility] Child Abuse Hotline at 404-631-0025. The trained  can help you deal with your frustration, so you dont hurt your baby.    Hearing Screening for Newborns: Why it Matters   A hearing test is typically done in newborns before they leave the hospital. This is part of the universal  hearing screening (UNHS) program. The goal of the program is to catch hearing problems as early as possible. If a hearing problem is identified early, it can be treated or managed sooner.   Why is hearing important?   Hearing is important because it can affect how your child develops. Good hearing is vital for:   Speech and language development   Learning   Social and emotional  development  What to expect from the screening   The hearing test is usually done as the baby sleeps. It is short and painless, and takes only about 10 minutes. You will likely receive the results before you leave the hospital. At that time you will be told whether your baby needs another test. Needing another test doesnt mean that your child has a hearing problem. But it does mean that the first test didnt give enough information. Your health care provider can tell you more. Make sure your baby has all follow-up hearing tests as directed.   What if my baby has signs of hearing loss?   If the test shows that your baby has signs of hearing loss, dont panic. More tests will be done to determine if theres really hearing loss. Even if your child has a hearing problem, many of these problems can be treated. Your childs health care provider will work with you to develop a plan to help your baby.   Can my baby pass the test and still have hearing problems?   Its possible for the test to miss a hearing problem. Some problems may not be caught with this screening. And in some cases, problems show up later. So the best thing to do is check whether your baby is meeting hearing, speech, and language milestones as he or she grows. Ask your health care provider for a list of these milestones.   How can I learn more?   Learn more about hearing screening from the [employer] [place] on Deafness and Other Communication Disorders.   Resources   Other online resources you may find helpful include:   American Academy of Audiology   American Speech-Language-Hearing Association   Babyhearing.org       Phototherapy for [place] Jaundice   Jaundice is a yellowing of the skin and the whites of the eyes. In newborns, its usually caused by the breakdown of red blood cells. This releases a yellow substance called bilirubin, which is processed by the babys body. Bilirubin then leaves the body through the babys urine and stool. Bilirubin  makes the skin and the whites of the eyes look jaundiced (yellow). This process is normal after birth. In fact, about half of all newborns have jaundice in their first week of life. Its usually temporary and doesnt require treatment. But in some cases, more severe or pronounced jaundice is a sign that the babys body cant process bilirubin quickly enough. If bilirubin levels become too high, they can be dangerous to a baby's developing brain and nervous system. In these cases, phototherapy is needed. This treatment helps speed up the breakdown of bilirubin.     How it works   The baby is placed under a special light. This breaks down bilirubin in the skin. During treatment, the babys eyes are covered for protection and comfort. The rest of the body is naked, except for a diaper. This way the light reaches most of the skin. The babys position will be changed often to make sure all of the skin is exposed to the light.   How long will phototherapy be needed?   Phototherapy is usually needed for a few days to a week. You will probably be asked to limit the amount of time the baby spends out from under the lights. The baby can usually be held for feedings if the level of jaundice is not too high. Fluids may be given through an IV (intravenous) line. These cause the baby to urinate more often, so the bilirubin leaves the body more efficiently       Jaundice       As red blood cells break down in the bloodstream and are replaced with new ones, bilirubin is released. It is the job of the liver to remove bilirubin from the bloodstream. However, the liver of a  baby may be too immature to remove it as fast as it forms. If too much bilirubin builds up in the blood, it causes a yellow color of the skin and the white part of the eyes. This yellow color is called jaundice. As the liver grows in the first weeks of life, the jaundice disappears.   Most jaundice is very mild, affecting only the face and trunk. It  does not need special treatment. Higher levels of bilirubin causes the yellow color to increase and spread to more parts of the body. This may occur in premature babies, or due to a blood type difference between mother and child, or from a large bruise on the scalp from the birth process.   Very high levels of bilirubin can cause permanent brain damage. Therefore, if the blood test shows the bilirubin level to be much higher than normal, special treatment called phototherapy is used. This requires special lights that shine on the skin (similar to tanning lights). This light changes the bilirubin to a substance that can be easily removed from the body.   Home Care:   Natural sunlight also helps the body clear excess bilirubin. For a mild case of jaundice, place your child in front of a closed window that receives a lot of light. Do this for ten minutes twice a day.   For moderate levels of bilirubin, your doctor may offer to treat your child at home with phototherapy lights. Follow the instructions for using the lights.   More severe cases must be treated in the hospital.  Follow Up   with your doctor or as advised by our staff. Keep any appointments for repeat blood tests to check bilirubin levels.   Get Prompt Medical Attention   if any of the following occur:   Skin becomes more yellow or jaundice spreads to the arms or legs   Jaundice lasts longer than one week   Poor feeding or poor weight gain   Unusual sleepiness, floppy arms or legs   Fever over 99.5°F (37.5°C) ear temp, or over 100.5°F (38.0°C) rectal    Signs of Jaundice   Jaundice is a temporary condition that happens when a s liver is still immature and not yet able to help the body get rid of bilirubin. Bilirubin is a substance that is found in the red blood cells. It can build up after birth as a result of the normal breakdown of red blood cells. If bilirubin levels get too high, they can be dangerous to your baby's developing brain and nervous  system. That is why it is important to check babies who have signs of jaundice to make sure the bilirubin level does not become unsafe. An immature liver is normal at this stage of your babys growth. It will quickly begin to remove bilirubin from the body. Almost half of all newborns show some signs of jaundice, such as yellow skin or eyes.       What to watch for   If a baby has jaundice, the skin or whites of the eyes turn yellow. Press lightly on your baby's forehead with your finger for a few seconds, then release. This makes it easier to see the yellow under your baby's skin color. It usually shows up 3 to 4 days after birth. Premature babies are especially at risk.   What to do       Always call your babys health care provider if you see any of the signs of jaundice. In some cases, it may be severe and may threaten a babys health. Your health care provider may recommend:   Breastfeeding your baby often. This means at least 8 to 10 times every 24 hours. If you are not breastfeeding, talk with your baby's health care provider about how much formula you should feed your baby.   Treating jaundice with special lights (phototherapy) at home or in the hospital. Your baby's health care provider can tell you more about phototherapy if it is needed.      Discharge Instructions for  Jaundice       Your baby has been diagnosed with jaundice. This is a short-term condition. Jaundice happens when your babys liver is still immature and isn't able to help the body get rid of bilirubin. Bilirubin is a substance that is found in the red blood cells. It can build up in the blood after your baby is born. This is part of the normal breakdown of red blood cells. But, if bilirubin levels become too high, they can be dangerous to your baby's developing brain and nervous system. That is why it is important to check babies who have signs of jaundice to make sure the bilirubin level does not become unsafe. An immature liver  is normal at this stage of your babys growth. Your baby's liver will quickly begin to activate the proteins needed to remove bilirubin from the body. Almost half of all babies show some signs of jaundice, such as yellow skin or eyes.   Home care   Watch your baby for signs of jaundice returning or getting worse:   Your babys skin or the whites of the eyes turn yellow.   If jaundice gets worse, the yellow color will move from the eyes to your baby's face; then it will move down your baby's body toward the feet.  Breastfeed your baby often, at least 8 to 12 times every 24 hours. (Most babies with jaundice get better after eating for several days because the bilirubin is removed from the body in the stools.)   Talk with your baby's health care provider about feedings if you are bottle-feeding your baby.      [place] Rash   This rash is also called erythema toxicum. It is normal in a  and occurs in about half of all children. It is not serious and not contagious.   The rash starts with small blisters on a red base. The blisters may have a white or yellow liquid inside. Sometimes there is just red spots. The rash begins on the second or third day of life and goes away in 1-2 weeks. It does not require treatment.   Home Care:   Bathe your baby as usual. No special treatment is required.   Follow Up   with your doctor as advised by our staff.   Get Prompt Medical Attention   if any of the following occur:   Rash lasts longer than two weeks   Rash changes appearance or becomes dark purplish in color   Fever over 100.5º F (38.0º C) oral, or over 101.5º F (38.6 C) rectal   Poor feeding   Signs of dehydration: No wet diapers for 6-8 hours or very dark, smelly urine; no tears when crying, dry mouth and lips   Unusual fussiness or drowsiness    Bathing Your [place]   Until your s umbilical cord falls off, sponge baths are the best way to bathe your baby. Gather supplies, including diapers and clothes, ahead of  time. This could include gentle baby soap, two washcloths, two towels, diapers, clothes, a blanket, and a hypoallergenic lotion (if desired). Bathe your  every 2 to 3 days, using the steps below as a guide. You can wash the diaper area more frequently as needed to keep the baby clean.         Step 1. Wash your babys face   Use warm water on a clean, soft cloth or cotton ball. Do not add soap.   Wipe the eyes gently. To prevent infection, use a fresh cotton ball or a clean part of the cloth for each eye. Wipe from the inner corner of the eye outward.   Wash behind babys ears and under the chin.  Step 2. Bathe the body, arms and legs   Place a small amount of mild, unscented soap on a clean, wet cloth.   Clean between any folds of skin.   Uncurl babys fingers and wipe the palms. Wash under babys arms and behind both knees.   Try to keep the babys umbilical cord dry. Uncover the area by folding the diaper under the umbilical cord so that air can help keep it dry. Dressing your baby in loose clothing will also help keep the area dry.   If your babys umbilical cord gets dirty, clean it with water and allow it to air dry.   Give your baby sponge baths until the umbilical cord has fallen off and the area is healed. If it gets wet, expose the area to air so it can dry.  Step 3. Wash your babys bottom   Bathe babys bottom after the rest of the body.   Wash girls from front to back only.   When bathing a boy, never push back the foreskin on an uncircumcised penis.  Step 4. Take care of babys scalp   Gently rub or comb your babys scalp each day.   Wash babys scalp once or twice a week, using a mild, no-tears shampoo. This can prevent cradle cap (a skin rash similar to dandruff common with infants). You can wrap the baby in a warm towel and then wash the scalp and hair.   Newborns rarely need lotions or powders. If you want to use a lotion, choose a hypoallergenic one. If you choose to use powders, apply the  powder to your hands and then rub in on your baby's skin. If the baby breathes in the powder, this can cause lung problems.      Skin Color Changes in the    In newborns, skin color changes are often due to something happening inside the body. Some color changes are normal. Others are signs of problems. The changes described below can happen to any . But skin color changes may be more obvious in babies born early, or prematurely, who have thinner skin than full-term babies.       Acrocyanosis   With acrocyanosis, the babys hands and feet are blue. This is normal right after birth. In fact, most newborns have some acrocyanosis for their first few hours of life. It happens because blood and oxygen arent circulating properly to the hands and feet yet. The problem goes away as the blood vessels in the babys hands and feet open up. Later, acrocyanosis can come back if the baby is cold (such as after a bath). This is normal, and will go away by itself.   Cyanosis   Cyanosis can be a blue color around the mouth or face, or over the whole body. It happens when there isnt enough oxygen traveling through the babys body. It means the baby is not getting enough oxygen. If you notice cyanosis, tell your baby's health care provider or a nurse right away.       Mottling   Mottling occurs when the babys skin looks blue and blotchy. There may also be a bluish marbled or weblike pattern on the babys skin. The parts of the skin that are not blotchy may be very pale (this is called pallor). Mottling could be due to a congenital heart problem, poor blood circulation, or an infection. Tell your baby's health care provider or a nurse right away if you notice mottling.       Jaundice   Jaundice is a yellowing of the skin and the whites of the eyes. It usually starts in the face, then moves down to the chest, lower belly, and legs. It often happens because the body is breaking down red blood cells (a normal process  after birth). The breakdown releases a yellow substance called bilirubin, which causes the yellow color. This substance is processed by the babys liver. It leaves the body through the urine or stool. Jaundice occurs in about half of all babies after birth, and often goes away by itself. But sometimes a babys liver cant process bilirubin as quickly as needed. This is especially true of babies born early, or prematurely. Treatment may be needed to help the bilirubin break down and get rid of the yellow color. If your baby is jaundiced, alert your baby's health care provider or a nurse.   Other Skin Color Changes   Also tell your baby's health care provider or nurse if you notice:   Redness around the babys umbilical cord, catheter site, IV site, or circumcision site. The site could be infected.   Bruising.   Red spots (caused by broken blood vessels). This is often a sign of trauma or infection. It could also be due to a problem with the bloods ability to clot.      Protect Your  from Cigarette Smoke   Youve likely heard about the dangers of secondhand smoke. But did you know that cigarette smoke is even worse for babies than it is for adults? Now that youve brought your  home, its crucial to keep cigarette smoke away from the baby. You may have already quit smoking when you found out you were going to have a baby. If not, its still not too late. If anyone else in your household smokes, now is the time for them to quit. If you or someone else in the household keeps smoking, at the very least, you can make changes to protect the baby. This goes for anyone who spends time near the baby, including grandparents, friends, and babysitters.   How cigarette smoke can harm your baby   Research shows that smoking around newborns can cause severe health problems. These include:   Asthma or other lifelong breathing problems   Worsening of colds or other respiratory problems   Poor growth and development,  both mentally and physically   Higher chance of SIDS (sudden infant death syndrome)      Protecting your baby from smoke   If someone in your household smokes and isnt ready to quit, you can still protect your baby. Ban smoking inside the house. Any smoker (including you, if you smoke) should smoke only outside, away from windows and doors. If you wear a jacket or sweatshirt while smoking, take it off before holding the baby. Never let anyone smoke around the baby. And never take the baby into an area where people are smoking. If you have visitors who smoke, you may want to explain your smoking rules before they come over, so they know what to expect.   Quitting is BEST for your baby   If you smoke, quitting is the best thing you can do for your baby. Quitting is hard, but you can do it! Here are some tips:   Tape a picture of your  to your pack of cigarettes. Look at it each time you smoke. This will remind you of the best reason to quit.   Join a support group or smoking cessation class. This will give you the support and skills you need to quit smoking. You may even meet other parents in the same situation. If you need help finding a group or class, your health care provider can suggest one in your area.   Ask other smokers in the family to quit with you. This way, you can support each other.   Talk to your health care provider about your desire to stop smoking. Both counseling and medications can help you successfully quit smoking.   If you dont succeed the first time, try again! Many people have to try more than once before they quit for good. Just remember, youre doing it for your baby. Trying to quit is better for your baby than if youd never tried at all.      Umbilical Cord Care   Proper care can help your babys umbilical cord heal. Do not pull or pick at the cord. It should fall off on its own within 2 weeks after the birth. Use the steps below as a guide.       Caring for Your Babys Umbilical  Cord   To help prevent infection and keep the cord dry:   Keep the cord open to the air.   Fold down the top edge of the diaper, so the diaper will not cover or rub against the cord.   Avoid clothing that constricts the cord.   Do not place the baby in bath water until the cord has fallen off and the area where the cord was attached is dry and healing. Instead, bathe your baby with a damp wash cloth.   Do not try to remove the cord. It will fall off on it's own.  Call your babys health care provider   Contact your baby's health care provider if you see any of the following:   Redness or swelling around the cord   Discharge or bad odor coming from the cord   The cord doesnt fall off by 4 weeks after the birth   Your baby has a rectal temperature of 100.4°F (38.0°C) or higher    Well-Baby Checkup:    Your babys first checkup will likely happen within a week of birth. At this  visit, the health care provider will examine your baby and ask questions about the first few days at home. This sheet describes some of what you can expect.       Development and milestones   The health care provider will ask questions about your . He or she will observe your baby to get an idea of his or her development. By this visit, your  is likely doing some of the following:   Blinking at a bright light   Trying to lift his or her head   Wiggling and squirming (each arm and leg should move about the same amount; if the baby favors one side, tell the health care provider)   Becoming startled upon hearing a loud noise  Feeding tips   Its normal for a  to lose up to 10% of his or her birth weight during the first week. This is usually gained back by about 2 weeks of age. If youre concerned about your s weight, tell the health care provider. To help your baby eat well:   Breast milk only is recommended for your baby's first 6 months.   Your baby should not have water unless hir or her health care  provider recommends it.   During the day, feed at least every 2-3 hours. You may need to wake your baby for daytime feedings.   At night, feed every 3-4 hours. At first, wake your baby for feedings if needed. Once your  is back to his or her birth weight, you may choose to let your baby sleep until he or she is hungry. Discuss this with your babys health care provider.   Ask the health care provider if your baby should take vitamin D.  If you breastfeed   Once your milk comes in, your breasts should feel full before a feeding and soft and deflated afterward. This likely means that your baby is getting enough to eat.   Breastfeeding sessions usually take around 15-20 minutes. If you feed the baby breast milk from a bottle, give 1-3 ounces at each feeding.    babies may want to eat more often than every 2-3 hours. Its okay to feed your baby more often if he or she seems hungry. Talk to the health care provider if youre concerned about your babys breastfeeding habits or weight gain.   It can take some time to get the hang of breastfeeding. It may be uncomfortable at first. If you have questions or need help, a lactation consultant can give you tips.  If you use formula   Use a formula specifically made for infants. If you need help choosing, ask the health care provider for a recommendation. Regular cow's milk is not an appropriate food for a  baby.   Feed around 1-3 ounces of formula at each feeding.  Hygiene tips   Some newborns stool (poop) after every feeding. Others stool less often. Both are normal. Change the diaper whenever its wet or dirty.   Its normal for a s stool (poop) to be yellow, watery, and look like it contains little seeds. The color may range from mustard yellow to pale yellow to green. If its another color, tell the health care provider.   A boy should have a strong stream when he urinates. If your son doesnt, tell the health care provider.   Give your baby  sponge baths until the umbilical cord falls off. If you have questions about caring for the umbilical cord, ask your babys health care provider.   After the cord falls off, bathe your  a few times per week. You may give baths more often if the baby seems to like it. But because youre cleaning the baby during diaper changes, a daily bath often isnt needed.   Its okay to use mild (hypoallergenic) creams or lotions on the babys skin. Avoid putting lotion on the babys hands.  Sleeping tips   Newborns usually sleep around 18-20 hours each day. To help your  sleep safely and soundly:   Always put the baby down to sleep on his or her back. This helps prevent SIDS (sudden infant death syndrome).   Dont put a pillow, heavy blankets, or stuffed animals in the crib. These could suffocate the baby.   Swaddling (wrapping the baby tightly in a blanket) can help your  feel safe and fall asleep.   If you co-sleep (share a bed with the baby), discuss health and safety issues with the babys health care provider.  Safety tips   To avoid burns, dont carry or drink hot liquids, such as coffee, near the baby. Turn the water heater down to a temperature of 120°F (49°C) or below.   Dont smoke or allow others to smoke near the your baby. If you or other family members smoke, do so outdoors and never around the baby.   Its usually fine to take a  out of the house. But avoid confined, crowded places where germs can spread. You may invite visitors to your home to see your baby, as long as theyre not sick.   When you do take the baby outside, avoid staying too long in direct sunlight. Keep the baby covered, or seek out the shade.   In the car, always put the baby in a rear-facing car seat. This should be secured in the back seat, according to the car seats directions. Never leave your baby alone in the car.   Do not leave your baby on a high surface, such as a table, bed, or couch. He or she could fall  and get hurt.   Older siblings will likely want to hold, play with, and get to know the baby. This is fine as long as an adult supervises.   Call the doctor right away if your baby has a rectal temperature over 100.4°F (38.0°).  Vaccines   Based on recommendations from the American Association of Pediatrics, at this visit your baby may receive the hepatitis B vaccination if he or she did not already receive it in the hospital.     Next checkup at: _______________________________   PARENT NOTES:                            Breath sounds clear and equal bilaterally.

## 2019-03-08 NOTE — ED PROVIDER NOTE - CLINICAL SUMMARY MEDICAL DECISION MAKING FREE TEXT BOX
Steve PGY1- 48 yoF, HTN, 5 days low abd pain, mostly suprapubic, some urinary hesitancy, no fever/N/V/decreased PO, some constipation. No flank pain. No pain similar in past. No vaginal sx. LNMP , , does not think shes pregnant, seen at [facility] MD today, "white cells in urine", sent for further eval Steve PGY1- 48 yoF, HTN, 5 days low abd pain, mostly suprapubic, some urinary hesitancy, no fever/N/V/decreased PO, some constipation. No flank pain. No pain similar in past. No vaginal sx. LNMP , , does not think shes pregnant, seen at [facility] MD today, "white cells in urine", sent for further eval  no distress, heart rrr, lungs cta, abd soft with suprapubic tenderness, no cva tenderness, extrem wwp, normal neuro exam  urine, u preg, tylenol  ddx: cysitis, if urine is normal and pain persists will consider further w/u

## 2019-03-08 NOTE — ED PROVIDER NOTE - PHYSICAL EXAMINATION
PHYSICAL EXAM:  GENERAL: NAD, well-groomed, well-developed  HEAD:  Atraumatic, Normocephalic  EYES: EOMI, PERRLA, conjunctiva and sclera clear  ENMT: No tonsillar erythema, exudates, or enlargement; Moist mucous membranes  NECK: Supple, No JVD, Normal thyroid  HEART: Regular rate and rhythm; No murmurs, rubs, or gallops  RESPIRATORY: CTA B/L, No W/R/R  ABDOMEN: Soft, suprapubic tenderness, no guarding, no LQ tenderness  BACK: no cva or midline tenderness, full ROM  NEURO: A&Ox3, nonfocal, moving all extremities  EXTREMITIES:  2+ Peripheral Pulses, No clubbing, cyanosis, or edema  SKIN: warm, dry, normal color, no rash or abnormal lesions

## 2019-03-09 ENCOUNTER — APPOINTMENT (OUTPATIENT)
Dept: INTERNAL MEDICINE | Facility: CLINIC | Age: 49
End: 2019-03-09

## 2019-03-09 LAB — SPECIMEN SOURCE: SIGNIFICANT CHANGE UP

## 2019-03-10 LAB — BACTERIA UR CULT: SIGNIFICANT CHANGE UP

## 2019-04-06 ENCOUNTER — APPOINTMENT (OUTPATIENT)
Dept: INTERNAL MEDICINE | Facility: CLINIC | Age: 49
End: 2019-04-06
Payer: COMMERCIAL

## 2019-04-06 VITALS
BODY MASS INDEX: 38.02 KG/M2 | RESPIRATION RATE: 12 BRPM | WEIGHT: 169 LBS | DIASTOLIC BLOOD PRESSURE: 87 MMHG | HEART RATE: 80 BPM | TEMPERATURE: 98.5 F | SYSTOLIC BLOOD PRESSURE: 139 MMHG | OXYGEN SATURATION: 96 % | HEIGHT: 56 IN

## 2019-04-06 PROCEDURE — 99396 PREV VISIT EST AGE 40-64: CPT

## 2019-04-06 RX ORDER — HYDROCHLOROTHIAZIDE 12.5 MG/1
12.5 TABLET ORAL
Qty: 30 | Refills: 0 | Status: DISCONTINUED | COMMUNITY
Start: 2018-08-04 | End: 2019-04-06

## 2019-04-06 NOTE — ASSESSMENT
[FreeTextEntry1] : 1. see plan\par 2. Hypertension\par low Na diet\par Losartan 50 mg QD\par follow up on lab results

## 2019-04-06 NOTE — PHYSICAL EXAM
[No Acute Distress] : no acute distress [Well Nourished] : well nourished [Well Developed] : well developed [Well-Appearing] : well-appearing [Normal Sclera/Conjunctiva] : normal sclera/conjunctiva [PERRL] : pupils equal round and reactive to light [EOMI] : extraocular movements intact [Normal Outer Ear/Nose] : the outer ears and nose were normal in appearance [Normal Oropharynx] : the oropharynx was normal [Normal TMs] : both tympanic membranes were normal [No JVD] : no jugular venous distention [Supple] : supple [No Lymphadenopathy] : no lymphadenopathy [No Respiratory Distress] : no respiratory distress  [Clear to Auscultation] : lungs were clear to auscultation bilaterally [No Accessory Muscle Use] : no accessory muscle use [Normal Rate] : normal rate  [Regular Rhythm] : with a regular rhythm [Normal S1, S2] : normal S1 and S2 [No Murmur] : no murmur heard [No Carotid Bruits] : no carotid bruits [Pedal Pulses Present] : the pedal pulses are present [No Edema] : there was no peripheral edema [Soft] : abdomen soft [Non Tender] : non-tender [Non-distended] : non-distended [No Masses] : no abdominal mass palpated [No HSM] : no HSM [Normal Bowel Sounds] : normal bowel sounds [Normal Posterior Cervical Nodes] : no posterior cervical lymphadenopathy [Normal Anterior Cervical Nodes] : no anterior cervical lymphadenopathy [No CVA Tenderness] : no CVA  tenderness [No Spinal Tenderness] : no spinal tenderness [No Joint Swelling] : no joint swelling [Grossly Normal Strength/Tone] : grossly normal strength/tone [Normal Gait] : normal gait [Coordination Grossly Intact] : coordination grossly intact [No Focal Deficits] : no focal deficits [Normal Affect] : the affect was normal [Normal Insight/Judgement] : insight and judgment were intact [de-identified] : + b/l elbow skin lesion

## 2019-04-15 DIAGNOSIS — R80.9 PROTEINURIA, UNSPECIFIED: ICD-10-CM

## 2019-04-15 LAB
25(OH)D3 SERPL-MCNC: 41.9 NG/ML
ALBUMIN SERPL ELPH-MCNC: 4.3 G/DL
ALP BLD-CCNC: 80 U/L
ALT SERPL-CCNC: 55 U/L
ANION GAP SERPL CALC-SCNC: 14 MMOL/L
APPEARANCE: CLEAR
AST SERPL-CCNC: 32 U/L
BACTERIA: NEGATIVE
BASOPHILS # BLD AUTO: 0.04 K/UL
BASOPHILS NFR BLD AUTO: 0.4 %
BILIRUB SERPL-MCNC: 0.4 MG/DL
BILIRUBIN URINE: NEGATIVE
BLOOD URINE: NEGATIVE
BUN SERPL-MCNC: 12 MG/DL
CALCIUM SERPL-MCNC: 9.5 MG/DL
CHLORIDE SERPL-SCNC: 105 MMOL/L
CHOLEST SERPL-MCNC: 193 MG/DL
CHOLEST/HDLC SERPL: 3.9 RATIO
CO2 SERPL-SCNC: 23 MMOL/L
COLOR: YELLOW
CREAT SERPL-MCNC: 0.61 MG/DL
EOSINOPHIL # BLD AUTO: 0.44 K/UL
EOSINOPHIL NFR BLD AUTO: 4.9 %
ESTIMATED AVERAGE GLUCOSE: 117 MG/DL
FSH SERPL-MCNC: 48.1 IU/L
GLUCOSE QUALITATIVE U: NEGATIVE
GLUCOSE SERPL-MCNC: 96 MG/DL
HBA1C MFR BLD HPLC: 5.7 %
HCG SERPL-MCNC: <1 MIU/ML
HCT VFR BLD CALC: 39.2 %
HDLC SERPL-MCNC: 49 MG/DL
HGB BLD-MCNC: 12.2 G/DL
HYALINE CASTS: 2 /LPF
IMM GRANULOCYTES NFR BLD AUTO: 0.2 %
KETONES URINE: NEGATIVE
LDLC SERPL CALC-MCNC: 109 MG/DL
LEUKOCYTE ESTERASE URINE: NEGATIVE
LH SERPL-ACNC: 24.9 IU/L
LYMPHOCYTES # BLD AUTO: 2.97 K/UL
LYMPHOCYTES NFR BLD AUTO: 33.3 %
MAN DIFF?: NORMAL
MCHC RBC-ENTMCNC: 28.6 PG
MCHC RBC-ENTMCNC: 31.1 GM/DL
MCV RBC AUTO: 91.8 FL
MICROSCOPIC-UA: NORMAL
MONOCYTES # BLD AUTO: 0.63 K/UL
MONOCYTES NFR BLD AUTO: 7.1 %
NEUTROPHILS # BLD AUTO: 4.81 K/UL
NEUTROPHILS NFR BLD AUTO: 54.1 %
NITRITE URINE: NEGATIVE
PH URINE: 6
PLATELET # BLD AUTO: 272 K/UL
POTASSIUM SERPL-SCNC: 4 MMOL/L
PROT SERPL-MCNC: 7.4 G/DL
PROTEIN URINE: ABNORMAL
RBC # BLD: 4.27 M/UL
RBC # FLD: 13.9 %
RED BLOOD CELLS URINE: 6 /HPF
SODIUM SERPL-SCNC: 142 MMOL/L
SPECIFIC GRAVITY URINE: 1.03
SQUAMOUS EPITHELIAL CELLS: 5 /HPF
TRIGL SERPL-MCNC: 176 MG/DL
TSH SERPL-ACNC: 0.58 UIU/ML
UROBILINOGEN URINE: NORMAL
VIT B12 SERPL-MCNC: 753 PG/ML
WBC # FLD AUTO: 8.91 K/UL
WHITE BLOOD CELLS URINE: 2 /HPF

## 2019-05-22 ENCOUNTER — APPOINTMENT (OUTPATIENT)
Dept: INTERNAL MEDICINE | Facility: CLINIC | Age: 49
End: 2019-05-22
Payer: COMMERCIAL

## 2019-05-22 VITALS
TEMPERATURE: 98.2 F | DIASTOLIC BLOOD PRESSURE: 85 MMHG | RESPIRATION RATE: 12 BRPM | HEIGHT: 56 IN | OXYGEN SATURATION: 97 % | WEIGHT: 171 LBS | SYSTOLIC BLOOD PRESSURE: 147 MMHG | BODY MASS INDEX: 38.46 KG/M2 | HEART RATE: 81 BPM

## 2019-05-22 PROCEDURE — 99214 OFFICE O/P EST MOD 30 MIN: CPT

## 2019-05-23 LAB
ALBUMIN SERPL ELPH-MCNC: 4.3 G/DL
ALP BLD-CCNC: 103 U/L
ALT SERPL-CCNC: 63 U/L
ANION GAP SERPL CALC-SCNC: 14 MMOL/L
APPEARANCE: CLEAR
AST SERPL-CCNC: 34 U/L
BACTERIA: NEGATIVE
BILIRUB SERPL-MCNC: 0.2 MG/DL
BILIRUBIN URINE: NEGATIVE
BLOOD URINE: NORMAL
BUN SERPL-MCNC: 14 MG/DL
CALCIUM SERPL-MCNC: 9.6 MG/DL
CHLORIDE SERPL-SCNC: 103 MMOL/L
CO2 SERPL-SCNC: 23 MMOL/L
COLOR: NORMAL
CREAT SERPL-MCNC: 0.57 MG/DL
GLUCOSE QUALITATIVE U: NEGATIVE
GLUCOSE SERPL-MCNC: 93 MG/DL
HAV IGM SER QL: NONREACTIVE
HBV CORE IGM SER QL: NONREACTIVE
HBV SURFACE AB SER QL: NONREACTIVE
HBV SURFACE AG SER QL: NONREACTIVE
HCV AB SER QL: NONREACTIVE
HCV S/CO RATIO: 0.2 S/CO
HYALINE CASTS: 3 /LPF
KETONES URINE: NEGATIVE
LEUKOCYTE ESTERASE URINE: NEGATIVE
MICROSCOPIC-UA: NORMAL
NITRITE URINE: NEGATIVE
PH URINE: 5.5
POTASSIUM SERPL-SCNC: 4.2 MMOL/L
PROT SERPL-MCNC: 7.4 G/DL
PROTEIN URINE: NEGATIVE
RED BLOOD CELLS URINE: 2 /HPF
SODIUM SERPL-SCNC: 140 MMOL/L
SPECIFIC GRAVITY URINE: 1.02
SQUAMOUS EPITHELIAL CELLS: 4 /HPF
UROBILINOGEN URINE: NORMAL
WHITE BLOOD CELLS URINE: 8 /HPF

## 2019-06-11 NOTE — PHYSICAL EXAM
[No Acute Distress] : no acute distress [Well Nourished] : well nourished [Well Developed] : well developed [Well-Appearing] : well-appearing [Normal Sclera/Conjunctiva] : normal sclera/conjunctiva [PERRL] : pupils equal round and reactive to light [EOMI] : extraocular movements intact [Normal Outer Ear/Nose] : the outer ears and nose were normal in appearance [Normal Oropharynx] : the oropharynx was normal [Normal TMs] : both tympanic membranes were normal [No JVD] : no jugular venous distention [Supple] : supple [No Lymphadenopathy] : no lymphadenopathy [No Respiratory Distress] : no respiratory distress  [Clear to Auscultation] : lungs were clear to auscultation bilaterally [No Accessory Muscle Use] : no accessory muscle use [Normal Rate] : normal rate  [Regular Rhythm] : with a regular rhythm [Normal S1, S2] : normal S1 and S2 [No Murmur] : no murmur heard [No Carotid Bruits] : no carotid bruits [Pedal Pulses Present] : the pedal pulses are present [No Edema] : there was no peripheral edema [Soft] : abdomen soft [Non Tender] : non-tender [Non-distended] : non-distended [No Masses] : no abdominal mass palpated [No HSM] : no HSM [Normal Bowel Sounds] : normal bowel sounds [Normal Posterior Cervical Nodes] : no posterior cervical lymphadenopathy [Normal Anterior Cervical Nodes] : no anterior cervical lymphadenopathy [No CVA Tenderness] : no CVA  tenderness [No Spinal Tenderness] : no spinal tenderness [No Joint Swelling] : no joint swelling [Grossly Normal Strength/Tone] : grossly normal strength/tone [Normal Gait] : normal gait [Coordination Grossly Intact] : coordination grossly intact [No Focal Deficits] : no focal deficits [Normal Affect] : the affect was normal [Normal Insight/Judgement] : insight and judgment were intact [de-identified] : + b/l elbow skin lesion

## 2019-06-11 NOTE — HISTORY OF PRESENT ILLNESS
[de-identified] : 48 year old female with h/o Glaucoma/ Hypertension / Hypercholesterolemia presents for follow up on chronic problems \par She is  doing well, physically active. She denies CP/SOB, dizziness, exertional symptoms, abdominal pain. \par Unable to loose weight despite been physically active

## 2019-06-11 NOTE — ASSESSMENT
[FreeTextEntry1] : 1. Elevated LFT likely fatty liver\par see plan \par 2. Hypertension\par low Na diet\par Losartan 50 mg QD\par follow up on lab results

## 2019-06-26 ENCOUNTER — APPOINTMENT (OUTPATIENT)
Dept: INTERNAL MEDICINE | Facility: CLINIC | Age: 49
End: 2019-06-26
Payer: COMMERCIAL

## 2019-06-26 VITALS
SYSTOLIC BLOOD PRESSURE: 151 MMHG | OXYGEN SATURATION: 98 % | HEIGHT: 56 IN | DIASTOLIC BLOOD PRESSURE: 83 MMHG | RESPIRATION RATE: 12 BRPM | BODY MASS INDEX: 37.57 KG/M2 | WEIGHT: 167 LBS | HEART RATE: 67 BPM | TEMPERATURE: 98 F

## 2019-06-26 PROCEDURE — 99214 OFFICE O/P EST MOD 30 MIN: CPT

## 2019-07-21 RX ORDER — CLOTRIMAZOLE AND BETAMETHASONE DIPROPIONATE 10; .5 MG/G; MG/G
1-0.05 CREAM TOPICAL TWICE DAILY
Qty: 1 | Refills: 0 | Status: DISCONTINUED | COMMUNITY
Start: 2018-10-27 | End: 2019-07-21

## 2019-07-21 NOTE — ASSESSMENT
[FreeTextEntry1] : 1. Hypertension, poor control\par low Na diet\par change Losartan to Losartan/ HCTZ 100/12.5 mg QD \par follow up 1 month\par 2. Hypercholesterolemia\par need low fat/ low cholesterol diet / exercise / repeat fasting lipids in 1 month\par

## 2019-07-21 NOTE — HISTORY OF PRESENT ILLNESS
[de-identified] : 48 year old female with h/o Glaucoma/ Hypertension / Hypercholesterolemia presents for follow up on chronic problems \par She is  doing well, physically active. She denies CP/SOB, dizziness, exertional symptoms, abdominal pain. \par She is on Losartan 100 mg QD for hypertension. Reports been compliant with meds intake

## 2019-07-21 NOTE — PHYSICAL EXAM
[Well Nourished] : well nourished [Well Developed] : well developed [No Acute Distress] : no acute distress [Normal Sclera/Conjunctiva] : normal sclera/conjunctiva [Well-Appearing] : well-appearing [Normal Oropharynx] : the oropharynx was normal [Normal Outer Ear/Nose] : the outer ears and nose were normal in appearance [EOMI] : extraocular movements intact [No JVD] : no jugular venous distention [Normal TMs] : both tympanic membranes were normal [Supple] : supple [No Respiratory Distress] : no respiratory distress  [No Lymphadenopathy] : no lymphadenopathy [Clear to Auscultation] : lungs were clear to auscultation bilaterally [Normal Rate] : normal rate  [No Accessory Muscle Use] : no accessory muscle use [Regular Rhythm] : with a regular rhythm [Normal S1, S2] : normal S1 and S2 [No Murmur] : no murmur heard [Pedal Pulses Present] : the pedal pulses are present [No Carotid Bruits] : no carotid bruits [Soft] : abdomen soft [No Edema] : there was no peripheral edema [Non Tender] : non-tender [Non-distended] : non-distended [Normal Bowel Sounds] : normal bowel sounds [No HSM] : no HSM [No Masses] : no abdominal mass palpated [No CVA Tenderness] : no CVA  tenderness [Normal Posterior Cervical Nodes] : no posterior cervical lymphadenopathy [Normal Anterior Cervical Nodes] : no anterior cervical lymphadenopathy [No Spinal Tenderness] : no spinal tenderness [Grossly Normal Strength/Tone] : grossly normal strength/tone [No Joint Swelling] : no joint swelling [Coordination Grossly Intact] : coordination grossly intact [Normal Gait] : normal gait [No Focal Deficits] : no focal deficits [de-identified] : + b/l elbow skin lesion  [Normal Affect] : the affect was normal [Normal Insight/Judgement] : insight and judgment were intact

## 2019-07-31 ENCOUNTER — APPOINTMENT (OUTPATIENT)
Dept: INTERNAL MEDICINE | Facility: CLINIC | Age: 49
End: 2019-07-31
Payer: COMMERCIAL

## 2019-07-31 VITALS
WEIGHT: 161 LBS | SYSTOLIC BLOOD PRESSURE: 147 MMHG | DIASTOLIC BLOOD PRESSURE: 89 MMHG | OXYGEN SATURATION: 98 % | TEMPERATURE: 98.6 F | RESPIRATION RATE: 13 BRPM | HEART RATE: 73 BPM | HEIGHT: 56 IN | BODY MASS INDEX: 36.22 KG/M2

## 2019-07-31 PROCEDURE — 99214 OFFICE O/P EST MOD 30 MIN: CPT

## 2019-07-31 RX ORDER — LOSARTAN POTASSIUM 50 MG/1
50 TABLET, FILM COATED ORAL
Qty: 90 | Refills: 2 | Status: DISCONTINUED | COMMUNITY
Start: 2018-03-09 | End: 2019-07-31

## 2019-08-01 LAB
ALBUMIN SERPL ELPH-MCNC: 4.4 G/DL
ALP BLD-CCNC: 104 U/L
ALT SERPL-CCNC: 39 U/L
ANION GAP SERPL CALC-SCNC: 13 MMOL/L
AST SERPL-CCNC: 27 U/L
BILIRUB SERPL-MCNC: 0.3 MG/DL
BUN SERPL-MCNC: 15 MG/DL
CALCIUM SERPL-MCNC: 9.9 MG/DL
CHLORIDE SERPL-SCNC: 108 MMOL/L
CO2 SERPL-SCNC: 24 MMOL/L
CREAT SERPL-MCNC: 0.69 MG/DL
GLUCOSE SERPL-MCNC: 107 MG/DL
POTASSIUM SERPL-SCNC: 4.5 MMOL/L
PROT SERPL-MCNC: 7.3 G/DL
SODIUM SERPL-SCNC: 145 MMOL/L

## 2019-08-22 LAB
CHOLEST SERPL-MCNC: 169 MG/DL
CHOLEST/HDLC SERPL: 3.6 RATIO
HDLC SERPL-MCNC: 47 MG/DL
LDLC SERPL CALC-MCNC: 98 MG/DL
TRIGL SERPL-MCNC: 121 MG/DL

## 2019-08-23 NOTE — PHYSICAL EXAM
[Well Nourished] : well nourished [No Acute Distress] : no acute distress [Well Developed] : well developed [Well-Appearing] : well-appearing [Normal Sclera/Conjunctiva] : normal sclera/conjunctiva [Normal Outer Ear/Nose] : the outer ears and nose were normal in appearance [EOMI] : extraocular movements intact [Normal Oropharynx] : the oropharynx was normal [Normal TMs] : both tympanic membranes were normal [No JVD] : no jugular venous distention [Supple] : supple [No Lymphadenopathy] : no lymphadenopathy [No Respiratory Distress] : no respiratory distress  [Clear to Auscultation] : lungs were clear to auscultation bilaterally [No Accessory Muscle Use] : no accessory muscle use [Normal Rate] : normal rate  [Normal S1, S2] : normal S1 and S2 [Regular Rhythm] : with a regular rhythm [No Murmur] : no murmur heard [No Carotid Bruits] : no carotid bruits [No Edema] : there was no peripheral edema [Pedal Pulses Present] : the pedal pulses are present [Soft] : abdomen soft [Non Tender] : non-tender [Non-distended] : non-distended [No Masses] : no abdominal mass palpated [No HSM] : no HSM [Normal Bowel Sounds] : normal bowel sounds [Normal Posterior Cervical Nodes] : no posterior cervical lymphadenopathy [Normal Anterior Cervical Nodes] : no anterior cervical lymphadenopathy [No CVA Tenderness] : no CVA  tenderness [No Spinal Tenderness] : no spinal tenderness [No Joint Swelling] : no joint swelling [Grossly Normal Strength/Tone] : grossly normal strength/tone [Normal Gait] : normal gait [Coordination Grossly Intact] : coordination grossly intact [No Focal Deficits] : no focal deficits [Normal Affect] : the affect was normal [Normal Insight/Judgement] : insight and judgment were intact [de-identified] : + b/l elbow skin lesion

## 2019-08-23 NOTE — HISTORY OF PRESENT ILLNESS
[de-identified] : 48 year old female with h/o Glaucoma/ Hypertension / Hypercholesterolemia presents for follow up on chronic problems \par She is  doing well, physically active. She denies CP/SOB, dizziness, exertional symptoms, abdominal pain. \par She is on Losartan / HCTZ 100 / 12.5 mg QD for hypertension. Reports been compliant with meds intake \par PAP/ mammo 2019

## 2019-10-03 ENCOUNTER — TRANSCRIPTION ENCOUNTER (OUTPATIENT)
Age: 49
End: 2019-10-03

## 2019-10-30 ENCOUNTER — APPOINTMENT (OUTPATIENT)
Dept: INTERNAL MEDICINE | Facility: CLINIC | Age: 49
End: 2019-10-30
Payer: COMMERCIAL

## 2019-10-30 VITALS
OXYGEN SATURATION: 100 % | HEIGHT: 56 IN | HEART RATE: 64 BPM | BODY MASS INDEX: 35.09 KG/M2 | SYSTOLIC BLOOD PRESSURE: 145 MMHG | DIASTOLIC BLOOD PRESSURE: 89 MMHG | WEIGHT: 156 LBS | RESPIRATION RATE: 12 BRPM | TEMPERATURE: 98.2 F

## 2019-10-30 PROCEDURE — 99214 OFFICE O/P EST MOD 30 MIN: CPT

## 2019-11-03 NOTE — HISTORY OF PRESENT ILLNESS
[de-identified] : 49 year old female with h/o Glaucoma/ Hypertension / Hypercholesterolemia presents for follow up on chronic problems \par She is  doing well, physically active. She denies CP/SOB, dizziness, exertional symptoms, abdominal pain. \par She is on Losartan / HCTZ 100 / 12.5 mg QD for hypertension. Pt unable to loose weight. \par PAP/ mammo 2019

## 2019-11-03 NOTE — ASSESSMENT
[FreeTextEntry1] : 1. Hypertension\par low Na diet\par change Losartan to Losartan/ HCTZ 100/25 mg QD \par cardio referral\par follow up 1 month\par 2. Hypercholesterolemia\par need low fat/ low cholesterol diet / exercise / repeat fasting lipids in 1 month\par

## 2019-11-03 NOTE — PHYSICAL EXAM
[No Acute Distress] : no acute distress [Well Nourished] : well nourished [Well Developed] : well developed [Well-Appearing] : well-appearing [Normal Sclera/Conjunctiva] : normal sclera/conjunctiva [EOMI] : extraocular movements intact [Normal Outer Ear/Nose] : the outer ears and nose were normal in appearance [Normal Oropharynx] : the oropharynx was normal [Normal TMs] : both tympanic membranes were normal [No JVD] : no jugular venous distention [Supple] : supple [No Lymphadenopathy] : no lymphadenopathy [No Respiratory Distress] : no respiratory distress  [Clear to Auscultation] : lungs were clear to auscultation bilaterally [No Accessory Muscle Use] : no accessory muscle use [Normal Rate] : normal rate  [Regular Rhythm] : with a regular rhythm [Normal S1, S2] : normal S1 and S2 [No Murmur] : no murmur heard [No Carotid Bruits] : no carotid bruits [Pedal Pulses Present] : the pedal pulses are present [No Edema] : there was no peripheral edema [Soft] : abdomen soft [Non Tender] : non-tender [Non-distended] : non-distended [No Masses] : no abdominal mass palpated [No HSM] : no HSM [Normal Bowel Sounds] : normal bowel sounds [Normal Posterior Cervical Nodes] : no posterior cervical lymphadenopathy [Normal Anterior Cervical Nodes] : no anterior cervical lymphadenopathy [No CVA Tenderness] : no CVA  tenderness [No Spinal Tenderness] : no spinal tenderness [No Joint Swelling] : no joint swelling [Grossly Normal Strength/Tone] : grossly normal strength/tone [Normal Gait] : normal gait [Coordination Grossly Intact] : coordination grossly intact [No Focal Deficits] : no focal deficits [Normal Affect] : the affect was normal [Normal Insight/Judgement] : insight and judgment were intact [de-identified] : + b/l elbow skin lesion

## 2019-11-06 ENCOUNTER — APPOINTMENT (OUTPATIENT)
Dept: CARDIOLOGY | Facility: CLINIC | Age: 49
End: 2019-11-06
Payer: COMMERCIAL

## 2019-11-06 ENCOUNTER — NON-APPOINTMENT (OUTPATIENT)
Age: 49
End: 2019-11-06

## 2019-11-06 VITALS — SYSTOLIC BLOOD PRESSURE: 120 MMHG | DIASTOLIC BLOOD PRESSURE: 80 MMHG

## 2019-11-06 VITALS
WEIGHT: 155 LBS | TEMPERATURE: 98.3 F | DIASTOLIC BLOOD PRESSURE: 87 MMHG | BODY MASS INDEX: 34.87 KG/M2 | SYSTOLIC BLOOD PRESSURE: 141 MMHG | RESPIRATION RATE: 12 BRPM | HEART RATE: 61 BPM | OXYGEN SATURATION: 99 % | HEIGHT: 56 IN

## 2019-11-06 PROCEDURE — 93000 ELECTROCARDIOGRAM COMPLETE: CPT

## 2019-11-06 PROCEDURE — 99204 OFFICE O/P NEW MOD 45 MIN: CPT

## 2019-11-06 NOTE — DISCUSSION/SUMMARY
[FreeTextEntry1] : The patient is a 49-year-old female perimenopausal , hypertension with dyspnea on exertion. \par #1 CV- echo and exercise stress test\par #2 Htn- borderline control on losartan/hctz\par #3 General- We discussed adherence to a Mediterranean diet, weight loss and at least 30 minutes of daily exercise.\par

## 2019-11-06 NOTE — HISTORY OF PRESENT ILLNESS
[FreeTextEntry1] : Kristin is a 49-year-old perimenopausal female hypertension with dyspnea on exertion. She does try to dance twice a week as her only exercise. No chest pain, palpitations, headaches or dizziness.

## 2019-11-06 NOTE — PHYSICAL EXAM
[General Appearance - Well Developed] : well developed [Normal Appearance] : normal appearance [Well Groomed] : well groomed [General Appearance - Well Nourished] : well nourished [No Deformities] : no deformities [General Appearance - In No Acute Distress] : no acute distress [Normal Conjunctiva] : the conjunctiva exhibited no abnormalities [Eyelids - No Xanthelasma] : the eyelids demonstrated no xanthelasmas [Normal Oral Mucosa] : normal oral mucosa [No Oral Pallor] : no oral pallor [No Oral Cyanosis] : no oral cyanosis [Normal Jugular Venous A Waves Present] : normal jugular venous A waves present [Normal Jugular Venous V Waves Present] : normal jugular venous V waves present [No Jugular Venous Rogers A Waves] : no jugular venous rogers A waves [Heart Rate And Rhythm] : heart rate and rhythm were normal [Heart Sounds] : normal S1 and S2 [Murmurs] : no murmurs present [Respiration, Rhythm And Depth] : normal respiratory rhythm and effort [Exaggerated Use Of Accessory Muscles For Inspiration] : no accessory muscle use [Auscultation Breath Sounds / Voice Sounds] : lungs were clear to auscultation bilaterally [Abdomen Soft] : soft [Abdomen Tenderness] : non-tender [Abdomen Mass (___ Cm)] : no abdominal mass palpated [Abnormal Walk] : normal gait [Gait - Sufficient For Exercise Testing] : the gait was sufficient for exercise testing [Nail Clubbing] : no clubbing of the fingernails [Cyanosis, Localized] : no localized cyanosis [Petechial Hemorrhages (___cm)] : no petechial hemorrhages [Skin Color & Pigmentation] : normal skin color and pigmentation [] : no rash [No Venous Stasis] : no venous stasis [Skin Lesions] : no skin lesions [No Skin Ulcers] : no skin ulcer [No Xanthoma] : no  xanthoma was observed [Oriented To Time, Place, And Person] : oriented to person, place, and time [Affect] : the affect was normal [Mood] : the mood was normal [No Anxiety] : not feeling anxious

## 2019-12-20 ENCOUNTER — MEDICATION RENEWAL (OUTPATIENT)
Age: 49
End: 2019-12-20

## 2019-12-20 ENCOUNTER — TRANSCRIPTION ENCOUNTER (OUTPATIENT)
Age: 49
End: 2019-12-20

## 2020-01-10 ENCOUNTER — APPOINTMENT (OUTPATIENT)
Dept: CARDIOLOGY | Facility: CLINIC | Age: 50
End: 2020-01-10
Payer: COMMERCIAL

## 2020-01-10 PROCEDURE — 93015 CV STRESS TEST SUPVJ I&R: CPT

## 2020-01-10 PROCEDURE — 93306 TTE W/DOPPLER COMPLETE: CPT

## 2020-02-01 ENCOUNTER — APPOINTMENT (OUTPATIENT)
Dept: INTERNAL MEDICINE | Facility: CLINIC | Age: 50
End: 2020-02-01
Payer: COMMERCIAL

## 2020-02-01 VITALS
HEIGHT: 56 IN | DIASTOLIC BLOOD PRESSURE: 88 MMHG | OXYGEN SATURATION: 99 % | RESPIRATION RATE: 12 BRPM | BODY MASS INDEX: 35.99 KG/M2 | WEIGHT: 160 LBS | HEART RATE: 97 BPM | TEMPERATURE: 98.2 F | SYSTOLIC BLOOD PRESSURE: 125 MMHG

## 2020-02-01 VITALS — SYSTOLIC BLOOD PRESSURE: 114 MMHG | DIASTOLIC BLOOD PRESSURE: 82 MMHG

## 2020-02-01 PROCEDURE — 99214 OFFICE O/P EST MOD 30 MIN: CPT

## 2020-02-01 NOTE — ASSESSMENT
[FreeTextEntry1] : HTN\par cont current meds\par \par Hypercholesterolemia\par cont low cholesterol/ low fat diet\par \par High risk for diabetes\par cont low carb/low sugar diet\par \par blood work ordered\par \par follow up in one week for lab results\par \par

## 2020-02-01 NOTE — HISTORY OF PRESENT ILLNESS
[de-identified] : 49 year old female with h/o Glaucoma/ Hypertension / Hypercholesterolemia presents for follow up on chronic problems \par She is  doing well, physically active ( dances about 3 X a week ) \par Denies CP/SOB, dizziness, exertional symptoms, abdominal pain. \par She is on Losartan / HCTZ 100 / 12.5 mg QD for hypertension. Reports compliance with taking her meds daily and following a low fat /  salt diet

## 2020-02-01 NOTE — PHYSICAL EXAM
[Normal Sclera/Conjunctiva] : normal sclera/conjunctiva [No JVD] : no jugular venous distention [Normal Outer Ear/Nose] : the outer ears and nose were normal in appearance [Normal] : normal rate, regular rhythm, normal S1 and S2 and no murmur heard [No Edema] : there was no peripheral edema [Non-distended] : non-distended [Non Tender] : non-tender [Soft] : abdomen soft [Normal Anterior Cervical Nodes] : no anterior cervical lymphadenopathy [Normal Posterior Cervical Nodes] : no posterior cervical lymphadenopathy [No Rash] : no rash [No CVA Tenderness] : no CVA  tenderness [No Joint Swelling] : no joint swelling [No Focal Deficits] : no focal deficits [Coordination Grossly Intact] : coordination grossly intact [Normal Gait] : normal gait [Alert and Oriented x3] : oriented to person, place, and time [Normal Affect] : the affect was normal [Normal Insight/Judgement] : insight and judgment were intact

## 2020-02-11 LAB
ESTIMATED AVERAGE GLUCOSE: 114 MG/DL
HBA1C MFR BLD HPLC: 5.6 %

## 2020-02-26 LAB
ALBUMIN SERPL ELPH-MCNC: 4.6 G/DL
ALP BLD-CCNC: 114 U/L
ALT SERPL-CCNC: 47 U/L
ANION GAP SERPL CALC-SCNC: 18 MMOL/L
AST SERPL-CCNC: 29 U/L
BILIRUB SERPL-MCNC: 0.3 MG/DL
BUN SERPL-MCNC: 21 MG/DL
CALCIUM SERPL-MCNC: 9.4 MG/DL
CHLORIDE SERPL-SCNC: 99 MMOL/L
CO2 SERPL-SCNC: 24 MMOL/L
CREAT SERPL-MCNC: 0.66 MG/DL
GLUCOSE SERPL-MCNC: 93 MG/DL
POTASSIUM SERPL-SCNC: 3.6 MMOL/L
PROT SERPL-MCNC: 7.4 G/DL
SODIUM SERPL-SCNC: 142 MMOL/L

## 2020-03-09 LAB
CHOLEST SERPL-MCNC: 214 MG/DL
CHOLEST/HDLC SERPL: 4.1 RATIO
HDLC SERPL-MCNC: 52 MG/DL
LDLC SERPL CALC-MCNC: 118 MG/DL
TRIGL SERPL-MCNC: 215 MG/DL

## 2020-03-17 ENCOUNTER — RX RENEWAL (OUTPATIENT)
Age: 50
End: 2020-03-17

## 2020-04-29 ENCOUNTER — APPOINTMENT (OUTPATIENT)
Dept: INTERNAL MEDICINE | Facility: CLINIC | Age: 50
End: 2020-04-29
Payer: COMMERCIAL

## 2020-04-29 VITALS
RESPIRATION RATE: 12 BRPM | TEMPERATURE: 98.3 F | HEART RATE: 79 BPM | SYSTOLIC BLOOD PRESSURE: 111 MMHG | HEIGHT: 56 IN | WEIGHT: 155 LBS | DIASTOLIC BLOOD PRESSURE: 75 MMHG | BODY MASS INDEX: 34.87 KG/M2 | OXYGEN SATURATION: 95 %

## 2020-04-29 PROCEDURE — 99396 PREV VISIT EST AGE 40-64: CPT

## 2020-05-06 DIAGNOSIS — E87.6 HYPOKALEMIA: ICD-10-CM

## 2020-05-06 LAB
25(OH)D3 SERPL-MCNC: 43.7 NG/ML
ALBUMIN SERPL ELPH-MCNC: 4.7 G/DL
ALP BLD-CCNC: 81 U/L
ALT SERPL-CCNC: 62 U/L
ANION GAP SERPL CALC-SCNC: 15 MMOL/L
APPEARANCE: CLEAR
AST SERPL-CCNC: 42 U/L
BACTERIA: NEGATIVE
BASOPHILS # BLD AUTO: 0.04 K/UL
BASOPHILS NFR BLD AUTO: 0.5 %
BILIRUB SERPL-MCNC: 0.4 MG/DL
BILIRUBIN URINE: NEGATIVE
BLOOD URINE: NEGATIVE
BUN SERPL-MCNC: 17 MG/DL
CALCIUM SERPL-MCNC: 9.9 MG/DL
CHLORIDE SERPL-SCNC: 98 MMOL/L
CHOLEST SERPL-MCNC: 226 MG/DL
CHOLEST/HDLC SERPL: 4.6 RATIO
CO2 SERPL-SCNC: 26 MMOL/L
COLOR: NORMAL
CREAT SERPL-MCNC: 0.65 MG/DL
EOSINOPHIL # BLD AUTO: 0.24 K/UL
EOSINOPHIL NFR BLD AUTO: 3.3 %
ESTIMATED AVERAGE GLUCOSE: 117 MG/DL
GLUCOSE QUALITATIVE U: NEGATIVE
GLUCOSE SERPL-MCNC: 97 MG/DL
HBA1C MFR BLD HPLC: 5.7 %
HCT VFR BLD CALC: 38.9 %
HDLC SERPL-MCNC: 49 MG/DL
HGB BLD-MCNC: 13.3 G/DL
HYALINE CASTS: 1 /LPF
IMM GRANULOCYTES NFR BLD AUTO: 0.1 %
KETONES URINE: NEGATIVE
LDLC SERPL CALC-MCNC: 136 MG/DL
LEUKOCYTE ESTERASE URINE: NEGATIVE
LYMPHOCYTES # BLD AUTO: 3.27 K/UL
LYMPHOCYTES NFR BLD AUTO: 44.8 %
MAN DIFF?: NORMAL
MCHC RBC-ENTMCNC: 28.9 PG
MCHC RBC-ENTMCNC: 34.2 GM/DL
MCV RBC AUTO: 84.4 FL
MICROSCOPIC-UA: NORMAL
MONOCYTES # BLD AUTO: 0.43 K/UL
MONOCYTES NFR BLD AUTO: 5.9 %
NEUTROPHILS # BLD AUTO: 3.31 K/UL
NEUTROPHILS NFR BLD AUTO: 45.4 %
NITRITE URINE: NEGATIVE
PH URINE: 6.5
PLATELET # BLD AUTO: 246 K/UL
POTASSIUM SERPL-SCNC: 3.1 MMOL/L
PROT SERPL-MCNC: 7.5 G/DL
PROTEIN URINE: NEGATIVE
RBC # BLD: 4.61 M/UL
RBC # FLD: 12.5 %
RED BLOOD CELLS URINE: 1 /HPF
SODIUM SERPL-SCNC: 139 MMOL/L
SPECIFIC GRAVITY URINE: 1.01
SQUAMOUS EPITHELIAL CELLS: 2 /HPF
TRIGL SERPL-MCNC: 208 MG/DL
TSH SERPL-ACNC: 0.71 UIU/ML
UROBILINOGEN URINE: NORMAL
VIT B12 SERPL-MCNC: 624 PG/ML
WBC # FLD AUTO: 7.3 K/UL
WHITE BLOOD CELLS URINE: 2 /HPF

## 2020-05-08 DIAGNOSIS — E87.5 HYPERKALEMIA: ICD-10-CM

## 2020-05-08 LAB — POTASSIUM SERPL-SCNC: 5.5 MMOL/L

## 2020-05-08 NOTE — HISTORY OF PRESENT ILLNESS
[de-identified] : 49 year old female with h/o Glaucoma/ Htn/ Hld presents for annual physical exam .\par She is doing well, denies CP/SOB, dizziness, exertional symptoms , abdominal pain.\par PAP 2019\par mammo 2019

## 2020-05-08 NOTE — PHYSICAL EXAM
[No Acute Distress] : no acute distress [Well Nourished] : well nourished [Well Developed] : well developed [Well-Appearing] : well-appearing [Normal Sclera/Conjunctiva] : normal sclera/conjunctiva [PERRL] : pupils equal round and reactive to light [Normal Outer Ear/Nose] : the outer ears and nose were normal in appearance [Normal Oropharynx] : the oropharynx was normal [No JVD] : no jugular venous distention [Normal TMs] : both tympanic membranes were normal [Supple] : supple [No Lymphadenopathy] : no lymphadenopathy [No Respiratory Distress] : no respiratory distress  [No Accessory Muscle Use] : no accessory muscle use [Clear to Auscultation] : lungs were clear to auscultation bilaterally [Normal Rate] : normal rate  [Regular Rhythm] : with a regular rhythm [Normal S1, S2] : normal S1 and S2 [No Murmur] : no murmur heard [No Carotid Bruits] : no carotid bruits [No Abdominal Bruit] : a ~M bruit was not heard ~T in the abdomen [No Varicosities] : no varicosities [Pedal Pulses Present] : the pedal pulses are present [No Edema] : there was no peripheral edema [No Palpable Aorta] : no palpable aorta [No Extremity Clubbing/Cyanosis] : no extremity clubbing/cyanosis [Soft] : abdomen soft [Non Tender] : non-tender [Non-distended] : non-distended [No Masses] : no abdominal mass palpated [No HSM] : no HSM [Normal Bowel Sounds] : normal bowel sounds [Normal Posterior Cervical Nodes] : no posterior cervical lymphadenopathy [Normal Anterior Cervical Nodes] : no anterior cervical lymphadenopathy [No Spinal Tenderness] : no spinal tenderness [No CVA Tenderness] : no CVA  tenderness [No Joint Swelling] : no joint swelling [Grossly Normal Strength/Tone] : grossly normal strength/tone [No Rash] : no rash [No Focal Deficits] : no focal deficits [Normal Gait] : normal gait [Normal Affect] : the affect was normal [Normal Insight/Judgement] : insight and judgment were intact

## 2020-05-11 LAB — POTASSIUM SERPL-SCNC: 3.5 MMOL/L

## 2020-07-29 ENCOUNTER — APPOINTMENT (OUTPATIENT)
Dept: INTERNAL MEDICINE | Facility: CLINIC | Age: 50
End: 2020-07-29
Payer: COMMERCIAL

## 2020-07-29 ENCOUNTER — APPOINTMENT (OUTPATIENT)
Dept: INTERNAL MEDICINE | Facility: CLINIC | Age: 50
End: 2020-07-29

## 2020-07-29 VITALS
SYSTOLIC BLOOD PRESSURE: 135 MMHG | WEIGHT: 157 LBS | HEIGHT: 56 IN | HEART RATE: 80 BPM | BODY MASS INDEX: 35.32 KG/M2 | OXYGEN SATURATION: 96 % | RESPIRATION RATE: 12 BRPM | TEMPERATURE: 99.4 F | DIASTOLIC BLOOD PRESSURE: 86 MMHG

## 2020-07-29 PROCEDURE — 99214 OFFICE O/P EST MOD 30 MIN: CPT

## 2020-08-05 LAB
ALBUMIN SERPL ELPH-MCNC: 4.2 G/DL
ALP BLD-CCNC: 66 U/L
ALT SERPL-CCNC: 26 U/L
ANION GAP SERPL CALC-SCNC: 16 MMOL/L
AST SERPL-CCNC: 24 U/L
BILIRUB SERPL-MCNC: 0.4 MG/DL
BUN SERPL-MCNC: 13 MG/DL
CALCIUM SERPL-MCNC: 9.6 MG/DL
CHLORIDE SERPL-SCNC: 106 MMOL/L
CO2 SERPL-SCNC: 20 MMOL/L
CREAT SERPL-MCNC: 0.59 MG/DL
GLUCOSE SERPL-MCNC: 90 MG/DL
POTASSIUM SERPL-SCNC: 4 MMOL/L
PROT SERPL-MCNC: 6.7 G/DL
SODIUM SERPL-SCNC: 142 MMOL/L

## 2020-08-10 NOTE — PHYSICAL EXAM
[No Acute Distress] : no acute distress [Well Nourished] : well nourished [Well Developed] : well developed [Well-Appearing] : well-appearing [Normal Sclera/Conjunctiva] : normal sclera/conjunctiva [PERRL] : pupils equal round and reactive to light [Normal Outer Ear/Nose] : the outer ears and nose were normal in appearance [Normal TMs] : both tympanic membranes were normal [Normal Oropharynx] : the oropharynx was normal [No JVD] : no jugular venous distention [No Lymphadenopathy] : no lymphadenopathy [Supple] : supple [No Respiratory Distress] : no respiratory distress  [Clear to Auscultation] : lungs were clear to auscultation bilaterally [No Accessory Muscle Use] : no accessory muscle use [Regular Rhythm] : with a regular rhythm [Normal Rate] : normal rate  [Normal S1, S2] : normal S1 and S2 [No Murmur] : no murmur heard [No Carotid Bruits] : no carotid bruits [No Abdominal Bruit] : a ~M bruit was not heard ~T in the abdomen [No Varicosities] : no varicosities [Pedal Pulses Present] : the pedal pulses are present [No Edema] : there was no peripheral edema [No Palpable Aorta] : no palpable aorta [No Extremity Clubbing/Cyanosis] : no extremity clubbing/cyanosis [Soft] : abdomen soft [Non Tender] : non-tender [Non-distended] : non-distended [No Masses] : no abdominal mass palpated [No HSM] : no HSM [Normal Bowel Sounds] : normal bowel sounds [Normal Posterior Cervical Nodes] : no posterior cervical lymphadenopathy [Normal Anterior Cervical Nodes] : no anterior cervical lymphadenopathy [No CVA Tenderness] : no CVA  tenderness [No Spinal Tenderness] : no spinal tenderness [No Joint Swelling] : no joint swelling [Grossly Normal Strength/Tone] : grossly normal strength/tone [No Rash] : no rash [No Focal Deficits] : no focal deficits [Normal Gait] : normal gait [Normal Affect] : the affect was normal [Normal Insight/Judgement] : insight and judgment were intact

## 2020-08-10 NOTE — HISTORY OF PRESENT ILLNESS
[de-identified] : 49 year old female with h/o Glaucoma/ Htn/ Hld presents for follow up on chronic problems \par She is doing well, denies CP/SOB, dizziness, exertional symptoms , abdominal pain.\par

## 2020-09-15 LAB
CHOLEST SERPL-MCNC: 192 MG/DL
CHOLEST/HDLC SERPL: 3.4 RATIO
HDLC SERPL-MCNC: 57 MG/DL
LDLC SERPL CALC-MCNC: 93 MG/DL
TRIGL SERPL-MCNC: 212 MG/DL

## 2021-07-14 ENCOUNTER — EMERGENCY (EMERGENCY)
Facility: HOSPITAL | Age: 51
LOS: 1 days | Discharge: HOME | End: 2021-07-14
Attending: STUDENT IN AN ORGANIZED HEALTH CARE EDUCATION/TRAINING PROGRAM
Payer: COMMERCIAL

## 2021-07-14 VITALS
RESPIRATION RATE: 17 BRPM | OXYGEN SATURATION: 97 % | TEMPERATURE: 99 F | SYSTOLIC BLOOD PRESSURE: 130 MMHG | DIASTOLIC BLOOD PRESSURE: 81 MMHG | HEART RATE: 92 BPM

## 2021-07-14 VITALS
RESPIRATION RATE: 18 BRPM | TEMPERATURE: 100 F | HEART RATE: 124 BPM | WEIGHT: 154.98 LBS | DIASTOLIC BLOOD PRESSURE: 86 MMHG | HEIGHT: 58 IN | OXYGEN SATURATION: 96 % | SYSTOLIC BLOOD PRESSURE: 153 MMHG

## 2021-07-14 DIAGNOSIS — Z90.49 ACQUIRED ABSENCE OF OTHER SPECIFIED PARTS OF DIGESTIVE TRACT: Chronic | ICD-10-CM

## 2021-07-14 LAB
ALBUMIN SERPL ELPH-MCNC: 4.8 G/DL — SIGNIFICANT CHANGE UP (ref 3.3–5)
ALP SERPL-CCNC: 103 U/L — SIGNIFICANT CHANGE UP (ref 40–120)
ALT FLD-CCNC: 33 U/L — SIGNIFICANT CHANGE UP (ref 10–45)
ANION GAP SERPL CALC-SCNC: 18 MMOL/L — HIGH (ref 5–17)
APPEARANCE UR: CLEAR — SIGNIFICANT CHANGE UP
AST SERPL-CCNC: 25 U/L — SIGNIFICANT CHANGE UP (ref 10–40)
BACTERIA # UR AUTO: NEGATIVE — SIGNIFICANT CHANGE UP
BASOPHILS # BLD AUTO: 0.01 K/UL — SIGNIFICANT CHANGE UP (ref 0–0.2)
BASOPHILS NFR BLD AUTO: 0.1 % — SIGNIFICANT CHANGE UP (ref 0–2)
BILIRUB SERPL-MCNC: 0.7 MG/DL — SIGNIFICANT CHANGE UP (ref 0.2–1.2)
BILIRUB UR-MCNC: NEGATIVE — SIGNIFICANT CHANGE UP
BUN SERPL-MCNC: 16 MG/DL — SIGNIFICANT CHANGE UP (ref 7–23)
CALCIUM SERPL-MCNC: 9.8 MG/DL — SIGNIFICANT CHANGE UP (ref 8.4–10.5)
CHLORIDE SERPL-SCNC: 104 MMOL/L — SIGNIFICANT CHANGE UP (ref 96–108)
CO2 SERPL-SCNC: 18 MMOL/L — LOW (ref 22–31)
COLOR SPEC: YELLOW — SIGNIFICANT CHANGE UP
CREAT SERPL-MCNC: 0.63 MG/DL — SIGNIFICANT CHANGE UP (ref 0.5–1.3)
DIFF PNL FLD: ABNORMAL
EOSINOPHIL # BLD AUTO: 0 K/UL — SIGNIFICANT CHANGE UP (ref 0–0.5)
EOSINOPHIL NFR BLD AUTO: 0 % — SIGNIFICANT CHANGE UP (ref 0–6)
EPI CELLS # UR: 11 /HPF — HIGH
GAS PNL BLDV: SIGNIFICANT CHANGE UP
GLUCOSE SERPL-MCNC: 121 MG/DL — HIGH (ref 70–99)
GLUCOSE UR QL: NEGATIVE — SIGNIFICANT CHANGE UP
HCT VFR BLD CALC: 41.3 % — SIGNIFICANT CHANGE UP (ref 34.5–45)
HGB BLD-MCNC: 14.5 G/DL — SIGNIFICANT CHANGE UP (ref 11.5–15.5)
HYALINE CASTS # UR AUTO: 23 /LPF — HIGH (ref 0–2)
IMM GRANULOCYTES NFR BLD AUTO: 0.4 % — SIGNIFICANT CHANGE UP (ref 0–1.5)
KETONES UR-MCNC: SIGNIFICANT CHANGE UP
LEUKOCYTE ESTERASE UR-ACNC: NEGATIVE — SIGNIFICANT CHANGE UP
LIDOCAIN IGE QN: 14 U/L — SIGNIFICANT CHANGE UP (ref 7–60)
LYMPHOCYTES # BLD AUTO: 0.49 K/UL — LOW (ref 1–3.3)
LYMPHOCYTES # BLD AUTO: 4.4 % — LOW (ref 13–44)
MAGNESIUM SERPL-MCNC: 1.8 MG/DL — SIGNIFICANT CHANGE UP (ref 1.6–2.6)
MCHC RBC-ENTMCNC: 28.8 PG — SIGNIFICANT CHANGE UP (ref 27–34)
MCHC RBC-ENTMCNC: 35.1 GM/DL — SIGNIFICANT CHANGE UP (ref 32–36)
MCV RBC AUTO: 81.9 FL — SIGNIFICANT CHANGE UP (ref 80–100)
MONOCYTES # BLD AUTO: 0.26 K/UL — SIGNIFICANT CHANGE UP (ref 0–0.9)
MONOCYTES NFR BLD AUTO: 2.3 % — SIGNIFICANT CHANGE UP (ref 2–14)
NEUTROPHILS # BLD AUTO: 10.34 K/UL — HIGH (ref 1.8–7.4)
NEUTROPHILS NFR BLD AUTO: 92.8 % — HIGH (ref 43–77)
NITRITE UR-MCNC: NEGATIVE — SIGNIFICANT CHANGE UP
NRBC # BLD: 0 /100 WBCS — SIGNIFICANT CHANGE UP (ref 0–0)
PH UR: 6 — SIGNIFICANT CHANGE UP (ref 5–8)
PHOSPHATE SERPL-MCNC: 4.7 MG/DL — HIGH (ref 2.5–4.5)
PLATELET # BLD AUTO: 246 K/UL — SIGNIFICANT CHANGE UP (ref 150–400)
POTASSIUM SERPL-MCNC: 3.9 MMOL/L — SIGNIFICANT CHANGE UP (ref 3.5–5.3)
POTASSIUM SERPL-SCNC: 3.9 MMOL/L — SIGNIFICANT CHANGE UP (ref 3.5–5.3)
PROT SERPL-MCNC: 8.2 G/DL — SIGNIFICANT CHANGE UP (ref 6–8.3)
PROT UR-MCNC: ABNORMAL
RBC # BLD: 5.04 M/UL — SIGNIFICANT CHANGE UP (ref 3.8–5.2)
RBC # FLD: 12.5 % — SIGNIFICANT CHANGE UP (ref 10.3–14.5)
RBC CASTS # UR COMP ASSIST: 3 /HPF — SIGNIFICANT CHANGE UP (ref 0–4)
SODIUM SERPL-SCNC: 140 MMOL/L — SIGNIFICANT CHANGE UP (ref 135–145)
SP GR SPEC: 1.03 — HIGH (ref 1.01–1.02)
UROBILINOGEN FLD QL: NEGATIVE — SIGNIFICANT CHANGE UP
WBC # BLD: 11.14 K/UL — HIGH (ref 3.8–10.5)
WBC # FLD AUTO: 11.14 K/UL — HIGH (ref 3.8–10.5)
WBC UR QL: 5 /HPF — SIGNIFICANT CHANGE UP (ref 0–5)

## 2021-07-14 PROCEDURE — 96374 THER/PROPH/DIAG INJ IV PUSH: CPT

## 2021-07-14 PROCEDURE — 85025 COMPLETE CBC W/AUTO DIFF WBC: CPT

## 2021-07-14 PROCEDURE — 83690 ASSAY OF LIPASE: CPT

## 2021-07-14 PROCEDURE — 82435 ASSAY OF BLOOD CHLORIDE: CPT

## 2021-07-14 PROCEDURE — 81001 URINALYSIS AUTO W/SCOPE: CPT

## 2021-07-14 PROCEDURE — 82330 ASSAY OF CALCIUM: CPT

## 2021-07-14 PROCEDURE — 82803 BLOOD GASES ANY COMBINATION: CPT

## 2021-07-14 PROCEDURE — 84132 ASSAY OF SERUM POTASSIUM: CPT

## 2021-07-14 PROCEDURE — 99285 EMERGENCY DEPT VISIT HI MDM: CPT | Mod: 25

## 2021-07-14 PROCEDURE — 83605 ASSAY OF LACTIC ACID: CPT

## 2021-07-14 PROCEDURE — 84702 CHORIONIC GONADOTROPIN TEST: CPT

## 2021-07-14 PROCEDURE — 85014 HEMATOCRIT: CPT

## 2021-07-14 PROCEDURE — 99284 EMERGENCY DEPT VISIT MOD MDM: CPT | Mod: 25

## 2021-07-14 PROCEDURE — 82947 ASSAY GLUCOSE BLOOD QUANT: CPT

## 2021-07-14 PROCEDURE — 93005 ELECTROCARDIOGRAM TRACING: CPT

## 2021-07-14 PROCEDURE — 84295 ASSAY OF SERUM SODIUM: CPT

## 2021-07-14 PROCEDURE — 93010 ELECTROCARDIOGRAM REPORT: CPT

## 2021-07-14 PROCEDURE — 83735 ASSAY OF MAGNESIUM: CPT

## 2021-07-14 PROCEDURE — 85018 HEMOGLOBIN: CPT

## 2021-07-14 PROCEDURE — 84100 ASSAY OF PHOSPHORUS: CPT

## 2021-07-14 PROCEDURE — 80053 COMPREHEN METABOLIC PANEL: CPT

## 2021-07-14 RX ORDER — ACETAMINOPHEN 500 MG
975 TABLET ORAL ONCE
Refills: 0 | Status: COMPLETED | OUTPATIENT
Start: 2021-07-14 | End: 2021-07-14

## 2021-07-14 RX ORDER — SODIUM CHLORIDE 9 MG/ML
1000 INJECTION INTRAMUSCULAR; INTRAVENOUS; SUBCUTANEOUS ONCE
Refills: 0 | Status: COMPLETED | OUTPATIENT
Start: 2021-07-14 | End: 2021-07-14

## 2021-07-14 RX ORDER — ONDANSETRON 8 MG/1
4 TABLET, FILM COATED ORAL ONCE
Refills: 0 | Status: COMPLETED | OUTPATIENT
Start: 2021-07-14 | End: 2021-07-14

## 2021-07-14 RX ORDER — METOCLOPRAMIDE HCL 10 MG
10 TABLET ORAL ONCE
Refills: 0 | Status: COMPLETED | OUTPATIENT
Start: 2021-07-14 | End: 2021-07-14

## 2021-07-14 RX ADMIN — ONDANSETRON 4 MILLIGRAM(S): 8 TABLET, FILM COATED ORAL at 19:05

## 2021-07-14 RX ADMIN — SODIUM CHLORIDE 1000 MILLILITER(S): 9 INJECTION INTRAMUSCULAR; INTRAVENOUS; SUBCUTANEOUS at 20:48

## 2021-07-14 RX ADMIN — Medication 975 MILLIGRAM(S): at 19:06

## 2021-07-14 NOTE — ED PROVIDER NOTE - PATIENT PORTAL LINK FT
You can access the FollowMyHealth Patient Portal offered by [facility] by registering at the following website: [email or web address]. By joining [employer]’s FollowMyHealth portal, you will also be able to view your health information using other applications (apps) compatible with our system.

## 2021-07-14 NOTE — ED PROVIDER NOTE - CLINICAL SUMMARY MEDICAL DECISION MAKING FREE TEXT BOX
ap- 50 F w/ hx of cholecystectomy, w/ known sick contact her grandchilds father sick w/ gi issues and she is sick w/ similar symptoms. Pt w/ no cp, no sob, +nausea x10 episodes of vomiting, w/ 10 episodes of diarrhea. On exam, no focal tenderness, pt w/ no active emesis, initially was tachycardic and borderline febrile. Will obtain labs, and reassess ap- 50 F w/ hx of cholecystectomy, w/ known sick contact her grandchilds father sick w/ gi issues and she is sick w/ similar symptoms. Pt w/ no cp, no sob, +nausea x10 episodes of vomiting, w/ 10 episodes of diarrhea. On exam, no focal tenderness, pt w/ no active emesis, initially was tachycardic and borderline febrile. Will obtain labs, and reassess. suspect likely gastroenteritis

## 2021-07-14 NOTE — ED PROVIDER NOTE - RAPID ASSESSMENT
50y F with PMHx of Cholecystectomy and + sick contact, presents to the ED c/o worsening mid abd pain associated with n/v onset 5 am this morning. Also reports diarrhea. States the pain is attributed to constant vomiting. Denies dysuria or new foods.    Amie AMARO (Samira), have documented this rapid assessment note under the dictation of Maria De Jesus Pereira) , which has been reviewed and affirmed to be accurate.  Patient was seen as a QPA patient. The patient will be seen and further worked up in the main emergency department and their care will be completed by the main emergency department team along with a thorough physical exam. Receiving team will follow up on labs, analgesia, any clinical imaging, reassess and disposition as clinically indicated, all decisions regarding the progression of care will be made at their discretion. - - - 50y F with PMHx of Cholecystectomy and + sick contact, presents to the ED c/o worsening mid abd pain associated with n/v onset 5 am this morning. Also reports diarrhea. States the pain is attributed to constant vomiting. Denies dysuria or new foods.    Amie AMARO (Samira), have documented this rapid assessment note under the dictation of Maria De Jesus Pereira) , which has been reviewed and affirmed to be accurate.  Patient was seen as a QPA patient. The patient will be seen and further worked up in the main emergency department and their care will be completed by the main emergency department team along with a thorough physical exam. Receiving team will follow up on labs, analgesia, any clinical imaging, reassess and disposition as clinically indicated, all decisions regarding the progression of care will be made at their discretion.    IELIZABETH, personally performed the service described in the documentation recorded by the scribe in my presence, and it accurately and completely records my words and actions.

## 2021-07-14 NOTE — ED PROVIDER NOTE - NS_EDPROVIDERDISPOUSERTYPE_ED_A_ED
Important Medication Changes: none    Further testing to be done: 24 hour Holter monitor    Next follow up visit: In office in 6 Months    HERE IS SOME IMPORTANT INFORMATION FOR OUR PATIENTS    If you need refills- call your pharmacist and they will contact our office.    If you have medical concerns call    [phone]. ([facility])                                                           [phone]  ([place])                                                           [phone]  ([place])                                                           [phone]  ([place])                 If you have a question during office hours, or need to make an appointment call [phone]. You will eventually speak with my nurses. If you need to speak to me directly, let them know and I will get back to you as soon as possible.  Better yet, you can consider signing up for [employer], our popular online communication portal to schedule an appointment, ask for a refill, see your results, or message our staff. Go to: [email or web address]      Adolfo Larkin MD  
Attending Attestation (For Attendings USE Only)...

## 2021-07-14 NOTE — ED PROVIDER NOTE - NSFOLLOWUPINSTRUCTIONS_ED_ALL_ED_FT
Nausea is a very common reason for people to visit the emergency room. Frequently the exact cause of these painful symptoms is not diagnosed in the ED. Very often, your emergency provider will focus on ruling out a dangerous diagnosis and trying to help you feel better. Luckily most of the time symptoms get better with time and rest.     HOME CARE INSTRUCTIONS   - rest  - drink plenty of non-caffeinated non-alcoholic fluids. Keep in mind salt restrictions if you are eating store bought soups  - avoid excessive motrin / Advil / ibuprofen (NSAID's) as these can make abdominal pain worse. Tylenol is a good choice for pain (make sure that you follow dosage guidelines)  - avoid fatty or spicy foods  - make an appointment to see your doctor  - If you have vomiting or diarrhea - DO NOT go to work while you have these contagious symptoms     RETURN TO THE EMERGENCY ROOM RIGHT AWAY  - IF YOUR PAIN CHANGES OR GETS WORSE  - IF YOU HAVE NAUSEA AND VOMITING THAT PERSISTS AND YOU CANNOT KEEP DOWN SOLIDS OR LIQUIDS  - YOUR PAIN RADIATES TO YOUR BACK  - YOU HAVE CHEST PAIN, SHORTNESS OF BREATH OR DIZZINESS  - IF YOU FEEL DIZZY OR WEAK  - IF YOU HAVE CHEST PAIN  - YOU HAVE BLEEDING FROM YOUR RECTUM OR DARK OR TARRY/STICKY STOOLS  - YOU HAVE BLEEDING FROM YOUR PENIS OR VAGINA OR BLOOD IN YOUR URINE  - YOUR PAIN IS NOT IMPROVING IN 24 HOURS  - YOU FEEL SICK OR HAVE CONCERNS

## 2021-07-14 NOTE — ED PROVIDER NOTE - PHYSICAL EXAMINATION
General appearance: NAD, conversant, afebrile    Neck: Trachea midline; Full range of motion, supple   Pulm: CTA bl, normal respiratory effort and no intercostal retractions, normal work of breathing   CV: RRR, No murmurs, rubs, or gallops   Abdomen: Soft, non-tender, non-distended; no guarding or rebound   Extremities: No peripheral edema    Skin: Dry, normal temperature, turgor and texture; no rash   Psych: Appropriate affect, cooperative; alert and oriented to person, place and time

## 2021-07-14 NOTE — ED ADULT NURSE REASSESSMENT NOTE - NS ED NURSE REASSESS COMMENT FT1
Rounding on patient complete, no complaints at this time. In no distress. States relief in pain and asking to go home. MD aware. All needs met. Updated on plan of care.

## 2021-07-14 NOTE — ED ADULT NURSE NOTE - OBJECTIVE STATEMENT
50y F with PMHx of Cholecystectomy with recent sick contact,  presents to the ED c/o worsening mid abd pain associated with n/v/d that began at 5 am this morning. Abd soft, nt/nd, lungs CTA. Denies chest pain, shortness of breath, headache, fevers, chills, urinary symptoms, hematuria, bloody stool, traumas.

## 2021-07-14 NOTE — ED ADULT NURSE NOTE - NSIMPLEMENTINTERV_GEN_ALL_ED
no chest pain, no cough, and no shortness of breath.
Implemented All Universal Safety Interventions:  [place] to call system. Call bell, personal items and telephone within reach. Instruct patient to call for assistance. Room bathroom lighting operational. Non-slip footwear when patient is off stretcher. Physically safe environment: no spills, clutter or unnecessary equipment. Stretcher in lowest position, wheels locked, appropriate side rails in place.

## 2021-07-24 ENCOUNTER — TRANSCRIPTION ENCOUNTER (OUTPATIENT)
Age: 51
End: 2021-07-24

## 2021-07-30 ENCOUNTER — APPOINTMENT (OUTPATIENT)
Dept: INTERNAL MEDICINE | Facility: CLINIC | Age: 51
End: 2021-07-30
Payer: COMMERCIAL

## 2021-07-30 ENCOUNTER — NON-APPOINTMENT (OUTPATIENT)
Age: 51
End: 2021-07-30

## 2021-07-30 PROBLEM — I10 ESSENTIAL (PRIMARY) HYPERTENSION: Chronic | Status: ACTIVE | Noted: 2021-07-14

## 2021-07-30 PROCEDURE — 99214 OFFICE O/P EST MOD 30 MIN: CPT | Mod: 95

## 2021-07-31 NOTE — PLAN
[FreeTextEntry1] : 1. see plan\par pt instructed to go to ED if worsening of symptoms or decrease O 2 sat

## 2021-07-31 NOTE — REVIEW OF SYSTEMS
[Fever] : fever [Chills] : chills [Fatigue] : fatigue [Cough] : cough [Dyspnea on Exertion] : dyspnea on exertion [Negative] : Heme/Lymph

## 2021-07-31 NOTE — HISTORY OF PRESENT ILLNESS
[de-identified] : 50 year old female presents for follow up.\par She tested positive for  COVID Saturday  >pt reports  fever, body aches, cough , chills. She is monitoring her oxygen at home > O2 > 96 % .\par She denies CP/SOB .

## 2021-08-04 ENCOUNTER — APPOINTMENT (OUTPATIENT)
Dept: INTERNAL MEDICINE | Facility: CLINIC | Age: 51
End: 2021-08-04
Payer: COMMERCIAL

## 2021-08-04 DIAGNOSIS — B37.0 CANDIDAL STOMATITIS: ICD-10-CM

## 2021-08-04 PROCEDURE — 99214 OFFICE O/P EST MOD 30 MIN: CPT | Mod: 95

## 2021-08-30 PROBLEM — B37.0 ORAL THRUSH: Status: ACTIVE | Noted: 2021-08-04

## 2021-08-30 NOTE — REVIEW OF SYSTEMS
[Fatigue] : fatigue [Shortness Of Breath] : no shortness of breath [Cough] : cough [Negative] : Heme/Lymph [FreeTextEntry4] : + oral thrush

## 2021-08-30 NOTE — PLAN
[FreeTextEntry1] : 1. see plan\par 2. COVID 19\par cont Tessalon/ supportive care \par 3. Htn\par cont current meds

## 2021-08-30 NOTE — HISTORY OF PRESENT ILLNESS
[Home] : at home, [unfilled] , at the time of the visit. [Medical Office: ([facility])___] : at the medical office located in  [de-identified] : 50 year old female presents for follow up.\par She tested positive for  COVID 10 days ago   >was  treated with Z pack/ steroids . She reports feeling better but developed oral thrush  She is monitoring her oxygen at home > O2 > 96 % .Still has generalized  weakness \par She denies CP/SOB .

## 2021-09-30 ENCOUNTER — LABORATORY RESULT (OUTPATIENT)
Age: 51
End: 2021-09-30

## 2021-09-30 ENCOUNTER — APPOINTMENT (OUTPATIENT)
Dept: INTERNAL MEDICINE | Facility: CLINIC | Age: 51
End: 2021-09-30
Payer: COMMERCIAL

## 2021-09-30 VITALS
WEIGHT: 157 LBS | TEMPERATURE: 96.8 F | BODY MASS INDEX: 35.32 KG/M2 | DIASTOLIC BLOOD PRESSURE: 84 MMHG | HEART RATE: 77 BPM | RESPIRATION RATE: 12 BRPM | OXYGEN SATURATION: 97 % | SYSTOLIC BLOOD PRESSURE: 134 MMHG | HEIGHT: 56 IN

## 2021-09-30 DIAGNOSIS — Z00.00 ENCOUNTER FOR GENERAL ADULT MEDICAL EXAMINATION W/OUT ABNORMAL FINDINGS: ICD-10-CM

## 2021-09-30 PROCEDURE — 99396 PREV VISIT EST AGE 40-64: CPT

## 2021-09-30 RX ORDER — BENZONATATE 200 MG/1
200 CAPSULE ORAL
Qty: 20 | Refills: 0 | Status: DISCONTINUED | COMMUNITY
Start: 2021-07-30 | End: 2021-09-30

## 2021-09-30 RX ORDER — LOSARTAN POTASSIUM 50 MG/1
50 TABLET, FILM COATED ORAL DAILY
Qty: 90 | Refills: 0 | Status: ACTIVE | COMMUNITY
Start: 2019-11-13 | End: 1900-01-01

## 2021-09-30 RX ORDER — AZITHROMYCIN 250 MG/1
250 TABLET, FILM COATED ORAL
Qty: 1 | Refills: 0 | Status: DISCONTINUED | COMMUNITY
Start: 2021-07-30 | End: 2021-09-30

## 2021-09-30 NOTE — PHYSICAL EXAM
[No Acute Distress] : no acute distress [Well Nourished] : well nourished [Normal Sclera/Conjunctiva] : normal sclera/conjunctiva [EOMI] : extraocular movements intact [Normal Outer Ear/Nose] : the outer ears and nose were normal in appearance [Normal Oropharynx] : the oropharynx was normal [No JVD] : no jugular venous distention [No Lymphadenopathy] : no lymphadenopathy [Supple] : supple [Thyroid Normal, No Nodules] : the thyroid was normal and there were no nodules present [No Respiratory Distress] : no respiratory distress  [No Accessory Muscle Use] : no accessory muscle use [Clear to Auscultation] : lungs were clear to auscultation bilaterally [Normal Rate] : normal rate  [Regular Rhythm] : with a regular rhythm [Normal S1, S2] : normal S1 and S2 [No Murmur] : no murmur heard [No Carotid Bruits] : no carotid bruits [Pedal Pulses Present] : the pedal pulses are present [Soft] : abdomen soft [Non Tender] : non-tender [Non-distended] : non-distended [Normal Bowel Sounds] : normal bowel sounds [Normal Posterior Cervical Nodes] : no posterior cervical lymphadenopathy [Normal Anterior Cervical Nodes] : no anterior cervical lymphadenopathy [No CVA Tenderness] : no CVA  tenderness [No Spinal Tenderness] : no spinal tenderness [No Joint Swelling] : no joint swelling [Grossly Normal Strength/Tone] : grossly normal strength/tone [No Rash] : no rash [Coordination Grossly Intact] : coordination grossly intact [No Focal Deficits] : no focal deficits [Normal Gait] : normal gait [Normal Affect] : the affect was normal [Normal Insight/Judgement] : insight and judgment were intact

## 2021-09-30 NOTE — PLAN
[FreeTextEntry1] : Physical annual\par see plan \par \par 1. Patient has appt for GYN and Mammo \par \par 2. Referral for GI given\par \par 3. HTN\par low sodium diet/ exercise discussed\par Stopped HCTZ on her own- d/c'd\par Changed Losartan to 50 mg daily\par follow up 1 month for BP check \par \par 4. Labs ordered pt to f/u for results\par

## 2021-09-30 NOTE — HISTORY OF PRESENT ILLNESS
[de-identified] : Kristin Randall 51 year old female with h/o Glaucoma/ Htn/ Hld presents for annual physical exam.\par \par She feels well, offers no complaints\par She had COVID in July, she denies any complications\par She admits not consistent with diet, but tries to exercise a couple times per week\par \par She reports she has not taking  medications in the past few months\par Denies CP, SOB, N/V or abdominal pain

## 2021-09-30 NOTE — HEALTH RISK ASSESSMENT
[Patient reported mammogram was normal] : Patient reported mammogram was normal [Patient reported PAP Smear was normal] : Patient reported PAP Smear was normal [With Family] : lives with family [Unemployed] : unemployed [Single] : single [# Of Children ___] : has [unfilled] children [] : No [MammogramDate] : 2 years ago  [PapSmearDate] : 2 years ago

## 2021-10-08 LAB
25(OH)D3 SERPL-MCNC: 62.6 NG/ML
ALBUMIN SERPL ELPH-MCNC: 4.3 G/DL
ALP BLD-CCNC: 107 U/L
ALT SERPL-CCNC: 37 U/L
ANION GAP SERPL CALC-SCNC: 16 MMOL/L
APPEARANCE: ABNORMAL
AST SERPL-CCNC: 33 U/L
BILIRUB SERPL-MCNC: 0.3 MG/DL
BILIRUBIN URINE: NEGATIVE
BLOOD URINE: NEGATIVE
BUN SERPL-MCNC: 16 MG/DL
CALCIUM SERPL-MCNC: 9.7 MG/DL
CHLORIDE SERPL-SCNC: 105 MMOL/L
CHOLEST SERPL-MCNC: 198 MG/DL
CO2 SERPL-SCNC: 21 MMOL/L
COLOR: YELLOW
COVID-19 NUCLEOCAPSID  GAM ANTIBODY INTERPRETATION: POSITIVE
CREAT SERPL-MCNC: 0.58 MG/DL
ESTIMATED AVERAGE GLUCOSE: 111 MG/DL
GLUCOSE QUALITATIVE U: NEGATIVE
GLUCOSE SERPL-MCNC: 81 MG/DL
HBA1C MFR BLD HPLC: 5.5 %
HDLC SERPL-MCNC: 49 MG/DL
KETONES URINE: NEGATIVE
LDLC SERPL CALC-MCNC: 124 MG/DL
LEUKOCYTE ESTERASE URINE: NEGATIVE
NITRITE URINE: POSITIVE
NONHDLC SERPL-MCNC: 148 MG/DL
PH URINE: 5.5
POTASSIUM SERPL-SCNC: 4.1 MMOL/L
PROT SERPL-MCNC: 7.1 G/DL
PROTEIN URINE: NEGATIVE
SARS-COV-2 AB SERPL QL IA: 193 INDEX
SODIUM SERPL-SCNC: 142 MMOL/L
SPECIFIC GRAVITY URINE: 1.02
TRIGL SERPL-MCNC: 120 MG/DL
TSH SERPL-ACNC: 0.85 UIU/ML
UROBILINOGEN URINE: NORMAL
VIT B12 SERPL-MCNC: 524 PG/ML

## 2021-10-20 ENCOUNTER — APPOINTMENT (OUTPATIENT)
Dept: INTERNAL MEDICINE | Facility: CLINIC | Age: 51
End: 2021-10-20
Payer: COMMERCIAL

## 2021-10-20 VITALS
HEART RATE: 74 BPM | DIASTOLIC BLOOD PRESSURE: 82 MMHG | RESPIRATION RATE: 12 BRPM | WEIGHT: 157.74 LBS | HEIGHT: 56 IN | OXYGEN SATURATION: 98 % | TEMPERATURE: 96.9 F | BODY MASS INDEX: 35.48 KG/M2 | SYSTOLIC BLOOD PRESSURE: 136 MMHG

## 2021-10-20 DIAGNOSIS — R03.0 ELEVATED BLOOD-PRESSURE READING, W/OUT DIAGNOSIS OF HYPERTENSION: ICD-10-CM

## 2021-10-20 DIAGNOSIS — B36.9 SUPERFICIAL MYCOSIS, UNSPECIFIED: ICD-10-CM

## 2021-10-20 DIAGNOSIS — R82.90 UNSPECIFIED ABNORMAL FINDINGS IN URINE: ICD-10-CM

## 2021-10-20 DIAGNOSIS — H40.9 UNSPECIFIED GLAUCOMA: ICD-10-CM

## 2021-10-20 DIAGNOSIS — R06.00 DYSPNEA, UNSPECIFIED: ICD-10-CM

## 2021-10-20 DIAGNOSIS — R93.5 ABNORMAL FINDINGS ON DIAGNOSTIC IMAGING OF OTHER ABDOMINAL REGIONS, INCLUDING RETROPERITONEUM: ICD-10-CM

## 2021-10-20 DIAGNOSIS — Z00.00 ENCOUNTER FOR GENERAL ADULT MEDICAL EXAMINATION W/OUT ABNORMAL FINDINGS: ICD-10-CM

## 2021-10-20 DIAGNOSIS — R31.9 HEMATURIA, UNSPECIFIED: ICD-10-CM

## 2021-10-20 DIAGNOSIS — R74.8 ABNORMAL LEVELS OF OTHER SERUM ENZYMES: ICD-10-CM

## 2021-10-20 DIAGNOSIS — E78.00 PURE HYPERCHOLESTEROLEMIA, UNSPECIFIED: ICD-10-CM

## 2021-10-20 DIAGNOSIS — U07.1 COVID-19: ICD-10-CM

## 2021-10-20 DIAGNOSIS — I10 ESSENTIAL (PRIMARY) HYPERTENSION: ICD-10-CM

## 2021-10-20 PROCEDURE — 99214 OFFICE O/P EST MOD 30 MIN: CPT

## 2021-10-21 PROBLEM — R31.9 BLOOD IN URINE: Status: ACTIVE | Noted: 2019-04-15

## 2021-10-21 PROBLEM — R74.8 ELEVATED LIVER ENZYMES: Status: ACTIVE | Noted: 2019-04-15

## 2021-10-21 PROBLEM — B36.9 FUNGAL DERMATITIS: Status: ACTIVE | Noted: 2018-10-27

## 2021-10-21 PROBLEM — E78.00 HYPERCHOLESTEROLEMIA: Status: ACTIVE | Noted: 2018-04-08

## 2021-10-21 PROBLEM — R03.0 ELEVATED BLOOD PRESSURE READING: Status: ACTIVE | Noted: 2018-02-28

## 2021-10-21 PROBLEM — I10 HYPERTENSION: Status: ACTIVE | Noted: 2018-03-09

## 2021-10-21 PROBLEM — U07.1 COVID-19 VIRUS INFECTION: Status: ACTIVE | Noted: 2021-07-30

## 2021-10-21 PROBLEM — R06.00 DYSPNEA: Status: ACTIVE | Noted: 2019-11-06

## 2021-10-21 PROBLEM — H40.9 GLAUCOMA: Status: ACTIVE | Noted: 2017-04-29

## 2021-10-21 PROBLEM — R93.5 ABNORMAL ULTRASOUND OF ABDOMEN: Status: ACTIVE | Noted: 2020-08-31

## 2021-10-21 PROBLEM — R82.90 ABNORMAL URINE FINDINGS: Status: ACTIVE | Noted: 2017-05-23

## 2021-10-21 RX ORDER — LOSARTAN POTASSIUM AND HYDROCHLOROTHIAZIDE 25; 100 MG/1; MG/1
100-25 TABLET ORAL DAILY
Qty: 90 | Refills: 1 | Status: DISCONTINUED | COMMUNITY
Start: 2019-06-26 | End: 2021-10-21

## 2021-10-21 RX ORDER — HYDROCHLOROTHIAZIDE 25 MG/1
25 TABLET ORAL
Qty: 30 | Refills: 0 | Status: DISCONTINUED | COMMUNITY
Start: 2019-11-13 | End: 2021-10-21

## 2021-10-21 RX ORDER — METHYLPREDNISOLONE 4 MG/1
4 TABLET ORAL
Qty: 1 | Refills: 0 | Status: DISCONTINUED | COMMUNITY
Start: 2021-07-30 | End: 2021-10-21

## 2021-10-21 RX ORDER — CLOTRIMAZOLE 10 MG/1
10 LOZENGE ORAL 4 TIMES DAILY
Qty: 40 | Refills: 0 | Status: DISCONTINUED | COMMUNITY
Start: 2021-08-04 | End: 2021-10-21

## 2021-10-21 RX ORDER — ALBUTEROL SULFATE 90 UG/1
108 (90 BASE) AEROSOL, METERED RESPIRATORY (INHALATION) EVERY 4 HOURS
Qty: 1 | Refills: 1 | Status: DISCONTINUED | COMMUNITY
Start: 2021-07-30 | End: 2021-10-21

## 2021-10-21 NOTE — PLAN
[FreeTextEntry1] : \par 1. HTN\par low sodium diet/ exercise discussed\par  Losartan to 50 mg daily\par follow up 1 month for BP check \par 2. Hch\par need low fat/ low cholesterol diet / exercise / repeat fasting lipids in 3 month\par 3. Glaucoma\par cont Combigan \par All labs d/w pt\par \par

## 2021-10-21 NOTE — HISTORY OF PRESENT ILLNESS
[de-identified] : Kristin Randall 51 year old female with h/o Glaucoma/ Htn/ Hld presents for follow up on hypertension and lab results\par She feels well, offers no complaints\par She is not compliant with diet/ exercise\par She is on Losartan 50 mg QD for hypertension \par Denies CP, SOB, N/V or abdominal pain

## 2021-10-27 LAB
BASOPHILS # BLD AUTO: 0.05 K/UL
BASOPHILS NFR BLD AUTO: 0.8 %
EOSINOPHIL # BLD AUTO: 0.21 K/UL
EOSINOPHIL NFR BLD AUTO: 3.5 %
HCT VFR BLD CALC: 41.2 %
HGB BLD-MCNC: 13.1 G/DL
IMM GRANULOCYTES NFR BLD AUTO: 0.2 %
LYMPHOCYTES # BLD AUTO: 2.27 K/UL
LYMPHOCYTES NFR BLD AUTO: 37.8 %
MAN DIFF?: NORMAL
MCHC RBC-ENTMCNC: 28.6 PG
MCHC RBC-ENTMCNC: 31.8 GM/DL
MCV RBC AUTO: 90 FL
MONOCYTES # BLD AUTO: 0.38 K/UL
MONOCYTES NFR BLD AUTO: 6.3 %
NEUTROPHILS # BLD AUTO: 3.09 K/UL
NEUTROPHILS NFR BLD AUTO: 51.4 %
PLATELET # BLD AUTO: 258 K/UL
RBC # BLD: 4.58 M/UL
RBC # FLD: 15.8 %
WBC # FLD AUTO: 6.01 K/UL

## 2024-10-23 ENCOUNTER — NON-APPOINTMENT (OUTPATIENT)
Age: 54
End: 2024-10-23

## 2024-10-31 ENCOUNTER — APPOINTMENT (OUTPATIENT)
Dept: OBGYN | Facility: CLINIC | Age: 54
End: 2024-10-31

## 2024-11-06 ENCOUNTER — APPOINTMENT (OUTPATIENT)
Dept: INTERNAL MEDICINE | Facility: CLINIC | Age: 54
End: 2024-11-06

## 2024-11-12 ENCOUNTER — APPOINTMENT (OUTPATIENT)
Dept: FAMILY MEDICINE | Facility: CLINIC | Age: 54
End: 2024-11-12
Payer: COMMERCIAL

## 2024-11-12 VITALS
TEMPERATURE: 97.7 F | HEIGHT: 56 IN | SYSTOLIC BLOOD PRESSURE: 130 MMHG | BODY MASS INDEX: 38.2 KG/M2 | OXYGEN SATURATION: 99 % | DIASTOLIC BLOOD PRESSURE: 85 MMHG | WEIGHT: 169.8 LBS | RESPIRATION RATE: 14 BRPM | HEART RATE: 75 BPM

## 2024-11-12 DIAGNOSIS — Z00.00 ENCOUNTER FOR GENERAL ADULT MEDICAL EXAMINATION W/OUT ABNORMAL FINDINGS: ICD-10-CM

## 2024-11-12 DIAGNOSIS — I10 ESSENTIAL (PRIMARY) HYPERTENSION: ICD-10-CM

## 2024-11-12 DIAGNOSIS — Z83.3 FAMILY HISTORY OF DIABETES MELLITUS: ICD-10-CM

## 2024-11-12 DIAGNOSIS — N28.9 DISORDER OF KIDNEY AND URETER, UNSPECIFIED: ICD-10-CM

## 2024-11-12 DIAGNOSIS — K76.0 FATTY (CHANGE OF) LIVER, NOT ELSEWHERE CLASSIFIED: ICD-10-CM

## 2024-11-12 PROCEDURE — 99386 PREV VISIT NEW AGE 40-64: CPT

## 2024-11-15 ENCOUNTER — APPOINTMENT (OUTPATIENT)
Dept: OBGYN | Facility: CLINIC | Age: 54
End: 2024-11-15
Payer: COMMERCIAL

## 2024-11-15 VITALS
SYSTOLIC BLOOD PRESSURE: 130 MMHG | WEIGHT: 169.8 LBS | DIASTOLIC BLOOD PRESSURE: 70 MMHG | BODY MASS INDEX: 36.63 KG/M2 | HEIGHT: 57 IN

## 2024-11-15 DIAGNOSIS — Z01.419 ENCOUNTER FOR GYNECOLOGICAL EXAMINATION (GENERAL) (ROUTINE) W/OUT ABNORMAL FINDINGS: ICD-10-CM

## 2024-11-15 PROCEDURE — 99459 PELVIC EXAMINATION: CPT

## 2024-11-15 PROCEDURE — 99386 PREV VISIT NEW AGE 40-64: CPT | Mod: 25

## 2024-11-15 PROCEDURE — G0444 DEPRESSION SCREEN ANNUAL: CPT | Mod: 59

## 2024-11-15 RX ORDER — ESTRADIOL 0.1 MG/G
0.1 CREAM VAGINAL
Qty: 1 | Refills: 5 | Status: ACTIVE | COMMUNITY
Start: 2024-11-15 | End: 1900-01-01

## 2024-11-18 LAB — HPV HIGH+LOW RISK DNA PNL CVX: NOT DETECTED

## 2024-11-20 LAB — CYTOLOGY CVX/VAG DOC THIN PREP: ABNORMAL

## 2024-11-27 ENCOUNTER — APPOINTMENT (OUTPATIENT)
Dept: MAMMOGRAPHY | Facility: CLINIC | Age: 54
End: 2024-11-27
Payer: COMMERCIAL

## 2024-11-27 ENCOUNTER — APPOINTMENT (OUTPATIENT)
Dept: ULTRASOUND IMAGING | Facility: CLINIC | Age: 54
End: 2024-11-27
Payer: COMMERCIAL

## 2024-11-27 PROCEDURE — 76775 US EXAM ABDO BACK WALL LIM: CPT | Mod: 59

## 2024-11-27 PROCEDURE — 76705 ECHO EXAM OF ABDOMEN: CPT

## 2024-12-06 ENCOUNTER — RX RENEWAL (OUTPATIENT)
Age: 54
End: 2024-12-06

## 2025-01-20 ENCOUNTER — NON-APPOINTMENT (OUTPATIENT)
Age: 55
End: 2025-01-20

## 2025-01-22 ENCOUNTER — NON-APPOINTMENT (OUTPATIENT)
Age: 55
End: 2025-01-22

## 2025-05-13 ENCOUNTER — APPOINTMENT (OUTPATIENT)
Dept: FAMILY MEDICINE | Facility: CLINIC | Age: 55
End: 2025-05-13

## 2025-07-07 NOTE — REVIEW OF SYSTEMS
[Time Spent: ___ minutes] : I have spent [unfilled] minutes of time on the encounter which excludes teaching and separately reported services. [Negative] : Heme/Lymph

## 2025-07-17 ENCOUNTER — EMERGENCY (EMERGENCY)
Facility: HOSPITAL | Age: 55
LOS: 1 days | End: 2025-07-17
Admitting: EMERGENCY MEDICINE
Payer: COMMERCIAL

## 2025-07-17 VITALS
TEMPERATURE: 98 F | RESPIRATION RATE: 18 BRPM | OXYGEN SATURATION: 97 % | DIASTOLIC BLOOD PRESSURE: 84 MMHG | HEART RATE: 91 BPM | WEIGHT: 166.89 LBS | SYSTOLIC BLOOD PRESSURE: 173 MMHG | HEIGHT: 57 IN

## 2025-07-17 DIAGNOSIS — Z90.49 ACQUIRED ABSENCE OF OTHER SPECIFIED PARTS OF DIGESTIVE TRACT: Chronic | ICD-10-CM

## 2025-07-17 PROCEDURE — 99284 EMERGENCY DEPT VISIT MOD MDM: CPT

## 2025-07-17 RX ORDER — LIDOCAINE HYDROCHLORIDE 20 MG/ML
1 JELLY TOPICAL ONCE
Refills: 0 | Status: COMPLETED | OUTPATIENT
Start: 2025-07-17 | End: 2025-07-17

## 2025-07-17 RX ORDER — ACETAMINOPHEN 500 MG/5ML
650 LIQUID (ML) ORAL ONCE
Refills: 0 | Status: COMPLETED | OUTPATIENT
Start: 2025-07-17 | End: 2025-07-17

## 2025-07-17 RX ADMIN — LIDOCAINE HYDROCHLORIDE 1 PATCH: 20 JELLY TOPICAL at 21:41

## 2025-07-17 RX ADMIN — Medication 650 MILLIGRAM(S): at 23:25

## 2025-07-17 NOTE — ED PROVIDER NOTE - PATIENT PORTAL LINK FT
You can access the FollowMyHealth Patient Portal offered by [facility] by registering at the following website: [email or web address]. By joining [email or web address] [email or web address] portal, you will also be able to view your health information using other applications (apps) compatible with our system.
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
## 2025-07-17 NOTE — ED PROVIDER NOTE - PHYSICAL EXAMINATION
Neuro: A&Ox3, PERRL, CN II-VII intact, sensation intact and equal b/l, strength 5/5 in all extremities, normal finger to nose, normal rapid alternating movements, no gait disturbance  MSK: b/l paraspinal muscle tenderness, mild c-spine tenderness, no midline thoracic or lumbar spinal tenderness, + b/l mid thoracic paraspinal muscle tenderness, FROM of neck and spine
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
## 2025-07-17 NOTE — ED PROVIDER NOTE - NSFOLLOWUPINSTRUCTIONS_ED_ALL_ED_FT
Follow up with your primary care doctor within 1 week  Follow up with the spine center if neck or back pain persists, office information attached please call to make an appointment  Take Tylenol 650mg every 6 hours for pain  -You can also take Ibuprofen 600mg every 8 hours for pain, take with food  Apply Lidocaine patch to area, leave patch on for 12 hours then remove for 12 hours before applying a new patch  Return to the ER with any worsening or concerning symptoms, increased pain, numbness, weakness, vomiting, vision changes or any other concerns. Follow up with your primary care doctor within 1 week  Follow up with the spine center if neck or back pain persists, office information attached please call to make an appointment  Take Tylenol 650mg every 6 hours for pain  -You can also take Ibuprofen 600mg every 8 hours for pain, take with food  Apply Lidocaine patch to area, leave patch on for 12 hours then remove for 12 hours before applying a new patch  Return to the ER with any worsening or concerning symptoms, increased pain, numbness, weakness, vomiting, vision changes or any other concerns.        English    Head Injury, Adult  Three rear views of the head showing how quick, sudden head movements injure the brain.  There are many types of head injuries. They can be as minor as a small bump. Some head injuries can be worse. Worse injuries include:  A strong hit to the head that shakes the brain back and forth, causing damage (concussion).  A bruise (contusion) of the brain. This means there is bleeding in the brain that can cause swelling.  A cracked skull (skull fracture).  Bleeding in the brain that gathers, gets thick (makes a clot), and forms a bump (hematoma).  Most problems from a head injury come in the first 24 hours. However, you may still have side effects up to 7–10 days after your injury. It is important to watch your condition for any changes. You may need to be watched in the emergency department or urgent care, or you may need to stay in the hospital.    What are the causes?  There are many possible causes of a head injury. A serious head injury may be caused by:  A car accident.  Bicycle or motorcycle accidents.  Sports injuries.  Falls.  Being hit by an object.  What are the signs or symptoms?  Symptoms of a head injury include a bruise, bump, or bleeding where the injury happened. Other physical symptoms may include:  Headache.  Feeling like you may vomit (nauseous) or vomiting.  Dizziness.  Blurred or double vision.  Being uncomfortable around bright lights or loud noises.  Feeling tired.  Trouble waking up.  Severe symptoms such as:  Feeling weak or numb on one side of the body.  Slurred speech.  Swallowing problems.  Fainting.  Shaking movements that you cannot control (seizures).  Mental or emotional symptoms may include:  Feeling grumpy or cranky.  Confusion and memory problems.  Having trouble paying attention or concentrating.  Changes in eating or sleeping habits.  Feeling worried or nervous (anxious).  Feeling sad (depressed).  How is this treated?  Treatment for this condition depends on how bad the injury is and the type of injury you have. The main goal is to prevent problems and to allow the brain time to heal.    Mild head injury    If you have a mild head injury, you may be sent home, and treatment may include:  Being watched. A responsible adult should stay with you for 24 hours after your injury and check on you often.  Physical rest.  Brain rest.  Pain medicines.  Very bad head injury    If you have a very bad head injury, treatment may include:  Being watched closely. This includes staying in the hospital.  Medicines to:  Help with pain.  Prevent seizures.  Help with brain swelling.  Protecting your airway and using a machine that helps you breathe (ventilator).  Watching for and manage swelling inside the brain.  Brain surgery. This may be needed to:  Remove a collection of blood or blood clots.  Stop the bleeding.  Remove a part of the skull. This allows room for the brain to swell.  Follow these instructions at home:  Activity    Rest.  Avoid activities that are hard or tiring.  Make sure you get enough sleep.  Let your brain rest. Do fewer activities that need a lot of thought or attention, such as:  Watching TV.  Playing memory games and doing puzzles.  Job-related work or homework.  Working on the computer, social media, and texting.  Avoid activities that could cause another head injury until your doctor says it is okay. This includes playing sports.  Ask your doctor when it is safe for you to go back to your normal activities, such as work or school.  Ask your doctor when you can drive, ride a bicycle, or use machines. Do not do these activities if you are dizzy.  Lifestyle    A sign telling the reader not to drink beer, wine, or hard liquor.  Do not drink alcohol until your doctor says it is okay.  Do not use drugs.  If it is hard to remember things, write them down.  If you are easily distracted, try to do one thing at a time.  Talk with family members or close friends when making important decisions.  Tell your friends, family, a trusted co-worker, and  about your injury, symptoms, and limits (restrictions). Have them watch for any problems that are new or getting worse.  General instructions    Take over-the-counter and prescription medicines only as told by your doctor.  Have a responsible adult stay with you for 24 hours after your head injury. This person should watch you for any changes in your symptoms and be ready to get help.  Keep all follow-up visits to catch any new problems early.  How is this prevented?    Having another head injury can be dangerous. Another injury can lead to brain damage, brain swelling, or death. You can avoid this by:  Working on your balance and strength. This can help you avoid falls.  Wearing a seat belt when you are in a moving vehicle.  Wearing a helmet when you:  Ride a bicycle.  Ski.  Do any other sport or activity that has a risk of injury.  Making your home safer by:  Getting rid of clutter from the floors and stairs. This includes things that can make you trip.  Using grab bars in bathrooms and handrails by stairs.  Placing non-slip mats on floors and in bathtubs.  Putting more light in dim areas.  Where to find more information  [employer] Association: biausa.org  Contact a doctor if:  These symptoms do not go away:  Headaches.  Dizziness.  Double vision or vision changes.  Trouble sleeping.  Changes in mood.  You have new symptoms.  Get help right away if:  You have sudden:  Headache that is very bad.  Vomiting that does not stop.  Changes in the size of one of your pupils. Pupils are the black centers of your eyes.  Changes in how you see (vision).  More confusion or more grumpy moods.  You have a seizure.  Your symptoms get worse.  You have a clear or bloody fluid coming from your nose or ears.  These symptoms may be an emergency. Get help right away. Call 911.  Do not wait to see if the symptoms will go away.  Do not drive yourself to the hospital.  This information is not intended to replace advice given to you by your health care provider. Make sure you discuss any questions you have with your health care provider.
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
## 2025-07-18 VITALS
HEART RATE: 63 BPM | TEMPERATURE: 98 F | DIASTOLIC BLOOD PRESSURE: 75 MMHG | SYSTOLIC BLOOD PRESSURE: 152 MMHG | OXYGEN SATURATION: 97 % | RESPIRATION RATE: 18 BRPM

## 2025-07-18 PROCEDURE — 70450 CT HEAD/BRAIN W/O DYE: CPT | Mod: 26

## 2025-07-18 PROCEDURE — 72125 CT NECK SPINE W/O DYE: CPT | Mod: 26
